# Patient Record
Sex: MALE | Employment: OTHER | URBAN - METROPOLITAN AREA
[De-identification: names, ages, dates, MRNs, and addresses within clinical notes are randomized per-mention and may not be internally consistent; named-entity substitution may affect disease eponyms.]

---

## 2017-01-18 ENCOUNTER — HOSPITAL ENCOUNTER (OUTPATIENT)
Dept: PREADMISSION TESTING | Age: 77
Discharge: HOME | End: 2017-01-18
Attending: PLASTIC SURGERY
Payer: MEDICARE

## 2017-01-18 DIAGNOSIS — Z01.812 BLOOD TESTS PRIOR TO TREATMENT OR PROCEDURE: ICD-10-CM

## 2017-01-18 DIAGNOSIS — C44.311 BASAL CELL CARCINOMA OF NASOLABIAL GROOVE: ICD-10-CM

## 2017-01-18 DIAGNOSIS — Z01.818 PREOP EXAMINATION: ICD-10-CM

## 2017-01-18 LAB
ATRIAL RATE: 375 BPM
CALCULATED R AXIS, ECG10: 64 DEGREES
CALCULATED T AXIS, ECG11: 6 DEGREES
DIAGNOSIS, 93000: NORMAL
FAX TO INFO,FAXT: NORMAL
FAX TO NUMBER,FAXN: NORMAL
HCT VFR BLD AUTO: 42 % (ref 36–48)
HGB BLD-MCNC: 15.2 G/DL (ref 13–16)
POTASSIUM SERPL-SCNC: 3.9 MMOL/L (ref 3.5–5.5)
Q-T INTERVAL, ECG07: 320 MS
QRS DURATION, ECG06: 94 MS
QTC CALCULATION (BEZET), ECG08: 465 MS
VENTRICULAR RATE, ECG03: 127 BPM

## 2017-01-18 PROCEDURE — 93005 ELECTROCARDIOGRAM TRACING: CPT

## 2017-01-18 PROCEDURE — 36415 COLL VENOUS BLD VENIPUNCTURE: CPT | Performed by: PLASTIC SURGERY

## 2017-01-18 PROCEDURE — 85018 HEMOGLOBIN: CPT | Performed by: PLASTIC SURGERY

## 2017-01-18 PROCEDURE — 84132 ASSAY OF SERUM POTASSIUM: CPT | Performed by: PLASTIC SURGERY

## 2017-05-14 ENCOUNTER — HOSPITAL ENCOUNTER (OUTPATIENT)
Age: 77
Setting detail: OBSERVATION
Discharge: HOME | End: 2017-05-17
Attending: EMERGENCY MEDICINE | Admitting: INTERNAL MEDICINE
Payer: MEDICARE

## 2017-05-14 ENCOUNTER — APPOINTMENT (OUTPATIENT)
Dept: CT IMAGING | Age: 77
End: 2017-05-14
Attending: EMERGENCY MEDICINE
Payer: MEDICARE

## 2017-05-14 DIAGNOSIS — I48.20 CHRONIC ATRIAL FIBRILLATION (HCC): ICD-10-CM

## 2017-05-14 DIAGNOSIS — R47.01 APHASIA: Primary | ICD-10-CM

## 2017-05-14 PROBLEM — I10 ESSENTIAL HYPERTENSION: Status: ACTIVE | Noted: 2017-05-14

## 2017-05-14 PROBLEM — G45.9 TIA (TRANSIENT ISCHEMIC ATTACK): Status: ACTIVE | Noted: 2017-05-14

## 2017-05-14 PROBLEM — E03.9 ACQUIRED HYPOTHYROIDISM: Status: ACTIVE | Noted: 2017-05-14

## 2017-05-14 LAB
ANION GAP BLD CALC-SCNC: 6 MMOL/L (ref 3–18)
APTT PPP: 42.6 SEC (ref 23–36.4)
ATRIAL RATE: 45 BPM
BASOPHILS # BLD AUTO: 0.1 K/UL (ref 0–0.06)
BASOPHILS # BLD: 1 % (ref 0–2)
BUN SERPL-MCNC: 18 MG/DL (ref 7–18)
BUN/CREAT SERPL: 17 (ref 12–20)
CALCIUM SERPL-MCNC: 9.5 MG/DL (ref 8.5–10.1)
CALCULATED R AXIS, ECG10: 37 DEGREES
CALCULATED T AXIS, ECG11: 39 DEGREES
CHLORIDE SERPL-SCNC: 102 MMOL/L (ref 100–108)
CK MB CFR SERPL CALC: 1.9 % (ref 0–4)
CK MB SERPL-MCNC: 2.2 NG/ML (ref 5–25)
CK SERPL-CCNC: 114 U/L (ref 39–308)
CO2 SERPL-SCNC: 28 MMOL/L (ref 21–32)
CREAT SERPL-MCNC: 1.05 MG/DL (ref 0.6–1.3)
DIAGNOSIS, 93000: NORMAL
DIFFERENTIAL METHOD BLD: ABNORMAL
EOSINOPHIL # BLD: 0.3 K/UL (ref 0–0.4)
EOSINOPHIL NFR BLD: 4 % (ref 0–5)
ERYTHROCYTE [DISTWIDTH] IN BLOOD BY AUTOMATED COUNT: 13.3 % (ref 11.6–14.5)
GLUCOSE BLD STRIP.AUTO-MCNC: 124 MG/DL (ref 70–110)
GLUCOSE SERPL-MCNC: 122 MG/DL (ref 74–99)
HCT VFR BLD AUTO: 43.1 % (ref 36–48)
HGB BLD-MCNC: 15.6 G/DL (ref 13–16)
INR PPP: 1.8 (ref 0.8–1.2)
LYMPHOCYTES # BLD AUTO: 31 % (ref 21–52)
LYMPHOCYTES # BLD: 1.8 K/UL (ref 0.9–3.6)
MCH RBC QN AUTO: 33.1 PG (ref 24–34)
MCHC RBC AUTO-ENTMCNC: 36.2 G/DL (ref 31–37)
MCV RBC AUTO: 91.5 FL (ref 74–97)
MONOCYTES # BLD: 0.4 K/UL (ref 0.05–1.2)
MONOCYTES NFR BLD AUTO: 7 % (ref 3–10)
NEUTS SEG # BLD: 3.4 K/UL (ref 1.8–8)
NEUTS SEG NFR BLD AUTO: 57 % (ref 40–73)
PLATELET # BLD AUTO: 117 K/UL (ref 135–420)
PMV BLD AUTO: 10.3 FL (ref 9.2–11.8)
POTASSIUM SERPL-SCNC: 4.2 MMOL/L (ref 3.5–5.5)
PROTHROMBIN TIME: 20.4 SEC (ref 11.5–15.2)
Q-T INTERVAL, ECG07: 448 MS
QRS DURATION, ECG06: 92 MS
QTC CALCULATION (BEZET), ECG08: 416 MS
RBC # BLD AUTO: 4.71 M/UL (ref 4.7–5.5)
SODIUM SERPL-SCNC: 136 MMOL/L (ref 136–145)
TROPONIN I SERPL-MCNC: <0.02 NG/ML (ref 0–0.04)
TSH SERPL DL<=0.05 MIU/L-ACNC: 2.98 UIU/ML (ref 0.36–3.74)
VENTRICULAR RATE, ECG03: 52 BPM
WBC # BLD AUTO: 6 K/UL (ref 4.6–13.2)

## 2017-05-14 PROCEDURE — 99218 HC RM OBSERVATION: CPT

## 2017-05-14 PROCEDURE — 74011250637 HC RX REV CODE- 250/637: Performed by: INTERNAL MEDICINE

## 2017-05-14 PROCEDURE — 82550 ASSAY OF CK (CPK): CPT | Performed by: INTERNAL MEDICINE

## 2017-05-14 PROCEDURE — 65390000012 HC CONDITION CODE 44 OBSERVATION

## 2017-05-14 PROCEDURE — 99285 EMERGENCY DEPT VISIT HI MDM: CPT

## 2017-05-14 PROCEDURE — 82962 GLUCOSE BLOOD TEST: CPT

## 2017-05-14 PROCEDURE — 84443 ASSAY THYROID STIM HORMONE: CPT | Performed by: INTERNAL MEDICINE

## 2017-05-14 PROCEDURE — 85610 PROTHROMBIN TIME: CPT | Performed by: EMERGENCY MEDICINE

## 2017-05-14 PROCEDURE — 85730 THROMBOPLASTIN TIME PARTIAL: CPT | Performed by: EMERGENCY MEDICINE

## 2017-05-14 PROCEDURE — 80048 BASIC METABOLIC PNL TOTAL CA: CPT | Performed by: EMERGENCY MEDICINE

## 2017-05-14 PROCEDURE — 85025 COMPLETE CBC W/AUTO DIFF WBC: CPT | Performed by: EMERGENCY MEDICINE

## 2017-05-14 PROCEDURE — 93005 ELECTROCARDIOGRAM TRACING: CPT

## 2017-05-14 PROCEDURE — 70450 CT HEAD/BRAIN W/O DYE: CPT

## 2017-05-14 RX ORDER — TAMSULOSIN HYDROCHLORIDE 0.4 MG/1
0.4 CAPSULE ORAL DAILY
Status: DISCONTINUED | OUTPATIENT
Start: 2017-05-15 | End: 2017-05-17 | Stop reason: HOSPADM

## 2017-05-14 RX ORDER — METOPROLOL TARTRATE 25 MG/1
25 TABLET, FILM COATED ORAL 2 TIMES DAILY
Status: DISCONTINUED | OUTPATIENT
Start: 2017-05-14 | End: 2017-05-16

## 2017-05-14 RX ORDER — PANTOPRAZOLE SODIUM 40 MG/1
40 TABLET, DELAYED RELEASE ORAL
Status: DISCONTINUED | OUTPATIENT
Start: 2017-05-15 | End: 2017-05-17 | Stop reason: HOSPADM

## 2017-05-14 RX ORDER — AMLODIPINE BESYLATE 5 MG/1
5 TABLET ORAL DAILY
Status: DISCONTINUED | OUTPATIENT
Start: 2017-05-15 | End: 2017-05-16

## 2017-05-14 RX ORDER — LEVOTHYROXINE SODIUM 25 UG/1
25 TABLET ORAL
Status: DISCONTINUED | OUTPATIENT
Start: 2017-05-15 | End: 2017-05-17 | Stop reason: HOSPADM

## 2017-05-14 RX ORDER — SODIUM CHLORIDE 0.9 % (FLUSH) 0.9 %
5-10 SYRINGE (ML) INJECTION AS NEEDED
Status: DISCONTINUED | OUTPATIENT
Start: 2017-05-14 | End: 2017-05-17 | Stop reason: HOSPADM

## 2017-05-14 RX ORDER — RAMIPRIL 5 MG/1
10 CAPSULE ORAL DAILY
Status: DISCONTINUED | OUTPATIENT
Start: 2017-05-14 | End: 2017-05-17 | Stop reason: HOSPADM

## 2017-05-14 RX ORDER — SODIUM CHLORIDE 0.9 % (FLUSH) 0.9 %
5-10 SYRINGE (ML) INJECTION EVERY 8 HOURS
Status: DISCONTINUED | OUTPATIENT
Start: 2017-05-14 | End: 2017-05-15

## 2017-05-14 RX ORDER — WARFARIN SODIUM 5 MG/1
5 TABLET ORAL
Status: COMPLETED | OUTPATIENT
Start: 2017-05-14 | End: 2017-05-14

## 2017-05-14 RX ADMIN — RAMIPRIL 10 MG: 5 CAPSULE ORAL at 18:22

## 2017-05-14 RX ADMIN — WARFARIN SODIUM 5 MG: 5 TABLET ORAL at 22:04

## 2017-05-14 RX ADMIN — Medication 10 ML: at 18:25

## 2017-05-14 RX ADMIN — METOPROLOL TARTRATE 25 MG: 25 TABLET ORAL at 18:22

## 2017-05-14 NOTE — IP AVS SNAPSHOT
[address] [address] [address] Patient: Tatiana Graham MRN: [id number] Donata Buckle You are allergic to the following No active allergies Recent Documentation Height Weight BMI Smoking Status 1.778 m 91.6 kg 28.98 kg/m2 Never Smoker Emergency Contacts Name Discharge Info Relation Home Work Mobile Erasto Novoa DISCHARGE CAREGIVER [3] Son [22] [phone] Virginia Carbtree  Girlfriend [18]   [phone] About your hospitalization You were admitted on:  May 14, 2017 You last received care in the:  [facility] [address] You were discharged on:  May 17, 2017 Unit phone number:  [phone] Why you were hospitalized Your primary diagnosis was:  Not on File Your diagnoses also included:  Aphasia, Tia (Transient Ischemic Attack), Essential Hypertension, Chronic Atrial Fibrillation (Hcc), Acquired Hypothyroidism Providers Seen During Your Hospitalizations Provider Role Specialty Primary office phone Starla Saeed MD Attending Provider Emergency Medicine [phone] Erick Seo MD Attending Provider Internal Medicine [phone] Your Primary Care Physician (PCP) Primary Care Physician Office Phone Office Fax Kimber Restrepo [phone] Follow-up Information Follow up With Details Comments Contact Info Vladimir Dunne MD On 5/18/2017 @ 2:30 pm 9 [address] 
[phone] Gauri Schmitt MD On 7/20/2017 @ 9:00 am (  in [address] with Fleurette Sicks ) [address] [address] 
[phone] Hasmukh Paulino MD On 6/20/2017 @ 9:30 am [address] [facility] [place] [address] [phone] Your Appointments Tuesday June 20, 2017  3:40 PM EDT Follow Up with Hasmukh Paulino MD  
 [address] [facility] ([facility] [address] [facility] [address] [phone] Current Discharge Medication List  
  
START taking these medications Dose & Instructions Dispensing Information Comments Morning Noon Evening Bedtime  
 atorvastatin 80 mg tablet Commonly known as:  LIPITOR Your last dose was: Your next dose is:    
   
   
 Dose:  80 mg Take 1 Tab by mouth nightly. Quantity:  30 Tab Refills:  0  
     
   
   
   
  
 metoprolol tartrate 25 mg tablet Commonly known as:  LOPRESSOR Your last dose was: Your next dose is:    
   
   
 Dose:  12.5 mg Take 0.5 Tabs by mouth every twelve (12) hours. Quantity:  60 Tab Refills:  0 CONTINUE these medications which have CHANGED Dose & Instructions Dispensing Information Comments Morning Noon Evening Bedtime  
 warfarin 6 mg tablet Commonly known as:  COUMADIN What changed:   
- medication strength 
- how much to take Your last dose was: Your next dose is:    
   
   
 Dose:  6 mg Take 1 Tab by mouth daily. Quantity:  15 Tab Refills:  0 CONTINUE these medications which have NOT CHANGED Dose & Instructions Dispensing Information Comments Morning Noon Evening Bedtime  
 levothyroxine 25 mcg tablet Commonly known as:  SYNTHROID Your last dose was: Your next dose is:    
   
   
 Dose:  25 mcg Take 25 mcg by mouth Daily (before breakfast). Refills:  0  
     
   
   
   
  
 probenecid-colchicine 500-0.5 mg per tablet Commonly known as:  ColBenemid Your last dose was: Your next dose is:    
   
   
 Dose:  1 Tab Take 1 Tab by mouth daily. Refills:  0  
     
   
   
   
  
 ramipril 10 mg capsule Commonly known as:  ALTACE Your last dose was:     
   
Your next dose is:    
   
   
 Dose:  10 mg  
 Take 10 mg by mouth daily. Refills:  0  
     
   
   
   
  
 tamsulosin 0.4 mg capsule Commonly known as:  FLOMAX Your last dose was: Your next dose is:    
   
   
 Dose:  0.4 mg Take 0.4 mg by mouth daily. Refills:  0 STOP taking these medications   
 amLODIPine 5 mg tablet Commonly known as:  NORVASC  
   
  
 metoprolol succinate 50 mg XL tablet Commonly known as:  TOPROL-XL  
   
  
 pravastatin 20 mg tablet Commonly known as:  PRAVACHOL Where to Get Your Medications Information on where to get these meds will be given to you by the nurse or doctor. ! Ask your nurse or doctor about these medications  
  atorvastatin 80 mg tablet  
 metoprolol tartrate 25 mg tablet  
 warfarin 6 mg tablet Discharge Instructions Discharge Instructions Patient: Froylan Rangel MRN: [id number]  [facility]: [id number] YOB: 1940  Age: 77 y.o. Sex: male DOA: 5/14/2017 LOS:  LOS: 0 days   Discharge Date: DIET:  Cardiac Diet ACTIVITY: Activity as tolerated ADDITIONAL INFORMATION: If you experience any of the following symptoms but not limited to Fever, chills, nausea, vomiting, diarrhea, change in mentation, falling, bleeding, shortness of breath, chest pain, please call your primary care physician or return to the emergency room if you cannot get hold of your doctor:  
 
FOLLOW UP CARE: 
Dr. Florencia Osorio MD in 7-10 days. Please call and set up an appointment. Dr. Mickey Krishna, cardio in 2 week Dr. Diaz Pickering, neuro in 4 week Jack Browne MD 
5/17/2017 2:25 PM 
 
M[employer] Activation Thank you for requesting access to [employer]. Please follow the instructions below to securely access and download your online medical record. [employer] allows you to send messages to your doctor, view your test results, renew your prescriptions, schedule appointments, and more. How Do I Sign Up? 1. In your internet browser, go to [email or web address] 
2. Click on the First Time User? Click Here link in the Sign In box. You will be redirect to the New Member Sign Up page. 3. Enter your [employer] Access Code exactly as it appears below. You will not need to use this code after youve completed the sign-up process. If you do not sign up before the expiration date, you must request a new code. [employer] Access Code: [email or web address] Expires: 2017 12:45 PM (This is the date your [employer] access code will ) 4. Enter the last four digits of your Social Security Number (xxxx) and Date of Birth (mm/dd/yyyy) as indicated and click Submit. You will be taken to the next sign-up page. 5. Create a [employer] ID. This will be your [employer] login ID and cannot be changed, so think of one that is secure and easy to remember. 6. Create a [employer] password. You can change your password at any time. 7. Enter your Password Reset Question and Answer. This can be used at a later time if you forget your password. 8. Enter your e-mail address. You will receive e-mail notification when new information is available in [address] 9. Click Sign Up. You can now view and download portions of your medical record. 10. Click the Download Summary menu link to download a portable copy of your medical information. Additional Information If you have questions, please visit the Frequently Asked Questions section of the [employer] website at [email or web address] Remember, [employer] is NOT to be used for urgent needs. For medical emergencies, dial 911. Patient armband removed and shredded DISCHARGE SUMMARY from Nurse The following personal items are in your possession at time of discharge: 
 
Dental Appliances: None Visual Aid: Glasses, With patient Home Medications: None Jewelry: With patient Clothing: Shorts, Shirt, Footwear, Undergarments Other Valuables: Cell Phone PATIENT INSTRUCTIONS: 
 
What to do at Home: 
Recommended activity: Activity as tolerated, If you experience any of the following symptoms chest pain, shortness of breath, fever, chills, elevated temperature greater than 100.9 F, please follow up with nearest Emergency room. *  Please give a list of your current medications to your Primary Care Provider. *  Please update this list whenever your medications are discontinued, doses are 
    changed, or new medications (including over-the-counter products) are added. *  Please carry medication information at all times in case of emergency situations. These are general instructions for a healthy lifestyle: No smoking/ No tobacco products/ Avoid exposure to second hand smoke Surgeon General's Warning:  Quitting smoking now greatly reduces serious risk to your health. Obesity, smoking, and sedentary lifestyle greatly increases your risk for illness A healthy diet, regular physical exercise & weight monitoring are important for maintaining a healthy lifestyle You may be retaining fluid if you have a history of heart failure or if you experience any of the following symptoms:  Weight gain of 3 pounds or more overnight or 5 pounds in a week, increased swelling in our hands or feet or shortness of breath while lying flat in bed. Please call your doctor as soon as you notice any of these symptoms; do not wait until your next office visit. Recognize signs and symptoms of STROKE: 
 
F-face looks uneven A-arms unable to move or move unevenly S-speech slurred or non-existent T-time-call 911 as soon as signs and symptoms begin-DO NOT go Back to bed or wait to see if you get better-TIME IS BRAIN. Warning Signs of HEART ATTACK Call 911 if you have these symptoms: 
? Chest discomfort.  Most heart attacks involve discomfort in the center of the chest that lasts more than a few minutes, or that goes away and comes back. It can feel like uncomfortable pressure, squeezing, fullness, or pain. ? Discomfort in other areas of the upper body. Symptoms can include pain or discomfort in one or both arms, the back, neck, jaw, or stomach. ? Shortness of breath with or without chest discomfort. ? Other signs may include breaking out in a cold sweat, nausea, or lightheadedness. Don't wait more than five minutes to call [address]! Fast action can save your life. Calling 911 is almost always the fastest way to get lifesaving treatment. Emergency Medical Services staff can begin treatment when they arrive  up to an hour sooner than if someone gets to the hospital by car. The discharge information has been reviewed with the patient. The patient verbalized understanding. Discharge medications reviewed with the patient and appropriate educational materials and side effects teaching were provided. Discharge Instructions Attachments/References ATORVASTATIN (BY MOUTH) (ENGLISH) METOPROLOL (BY MOUTH) (ENGLISH) Discharge Orders None [employer] Announcement We are excited to announce that we are making your provider's discharge notes available to you in Exhibition [employer]. You will see these notes when they are completed and signed by the physician that discharged you from your recent hospital stay. If you have any questions or concerns about any information you see in Exhibition A, please call the Health Information Department where you were seen or reach out to your Primary Care Provider for more information about your plan of care. Introducing hospitals & HEALTH SERVICES! [address] introduces Exhibition A patient portal. Now you can access parts of your medical record, email your doctor's office, and request medication refills online. 1. In your internet browser, go to [email or web address] 2. Click on the First Time User? Click Here link in the Sign In box. You will see the New Member Sign Up page. 3. Enter your [employer] Access Code exactly as it appears below. You will not need to use this code after youve completed the sign-up process. If you do not sign up before the expiration date, you must request a new code. · [employer] Access Code: [email or web address] Expires: 8/13/2017 12:45 PM 
 
4. Enter the last four digits of your Social Security Number (xxxx) and Date of Birth (mm/dd/yyyy) as indicated and click Submit. You will be taken to the next sign-up page. 5. Create a [employer] ID. This will be your [employer] login ID and cannot be changed, so think of one that is secure and easy to remember. 6. Create a [employer] password. You can change your password at any time. 7. Enter your Password Reset Question and Answer. This can be used at a later time if you forget your password. 8. Enter your e-mail address. You will receive e-mail notification when new information is available in [address] 9. Click Sign Up. You can now view and download portions of your medical record. 10. Click the Download Summary menu link to download a portable copy of your medical information. If you have questions, please visit the Frequently Asked Questions section of the [employer] website. Remember, [employer] is NOT to be used for urgent needs. For medical emergencies, dial 911. Now available from your iPhone and Android! General Information Please provide this summary of care documentation to your next provider. Patient Signature:  ____________________________________________________________ Date:  ____________________________________________________________  
  
Michelle Lightning Provider Signature:  ____________________________________________________________ Date:  ____________________________________________________________ More Information Atorvastatin (By mouth) Atorvastatin (a-tor-va-STAT-in) Treats high cholesterol and triglyceride levels. Reduces the risk of angina, stroke, heart attack, or certain heart and blood vessel problems. This medicine is a statin. Brand Name(s): Lipitor There may be other brand names for this medicine. When This Medicine Should Not Be Used: This medicine is not right for everyone. Do not use it if you had an allergic reaction to atorvastatin, if you have active liver disease, or if you are pregnant or breastfeeding. How to Use This Medicine:  
Tablet · Take your medicine as directed. Your dose may need to be changed several times to find what works best for you. · Take this medicine at the same time each day. · Swallow the tablet whole. Do not break it. · Missed dose: Take a dose as soon as you remember. If it is less than 12 hours until your next dose, skip the missed dose and take the next dose at the regular time. Do not take 2 doses of this medicine within 12 hours. · Read and follow the patient instructions that come with this medicine. Talk to your doctor or pharmacist if you have any questions. · Store the medicine in a closed container at room temperature, away from heat, moisture, and direct light. Drugs and Foods to Avoid: Ask your doctor or pharmacist before using any other medicine, including over-the-counter medicines, vitamins, and herbal products. · Some medicines can affect how atorvastatin works. Tell your doctor if you also use birth control pills, boceprevir, cimetidine, colchicine, cyclosporine, digoxin, niacin, rifampin, spironolactone, telaprevir, medicine to treat an infection, or medicine to treat HIV/AIDS. Warnings While Using This Medicine: · It is not safe to take this medicine during pregnancy. It could harm an unborn baby. Tell your doctor right away if you become pregnant.  
· Tell your doctor if you have kidney disease, diabetes, muscle pain or weakness, thyroid problems, have recently had a stroke or TIA (transient ischemic attack), or have a history of liver disease. Tell your doctor if you drink grapefruit juice or drink alcohol regularly. · This medicine can cause muscle problems, which can lead to kidney problems. · Tell any doctor or dentist who treats you that you use this medicine. You may need to stop using it if you have surgery, have an injury, or develop serious health problems. · Your doctor will do lab tests at regular visits to check on the effects of this medicine. Keep all appointments. · Keep all medicine out of the reach of children. Never share your medicine with anyone. Possible Side Effects While Using This Medicine:  
Call your doctor right away if you notice any of these side effects: · Allergic reaction: Itching or hives, swelling in your face or hands, swelling or tingling in your mouth or throat, chest tightness, trouble breathing · Blistering, peeling, red skin rash · Change in how much or how often you urinate · Dark urine or pale stools, nausea, vomiting, loss of appetite, stomach pain, yellow skin or eyes · Fever · Muscle pain, tenderness, or weakness · Unusual tiredness If you notice these less serious side effects, talk with your doctor: · Diarrhea · Joint pain If you notice other side effects that you think are caused by this medicine, tell your doctor. Call your doctor for medical advice about side effects. You may report side effects to FDA at 2-395-FDA-1944 © 2017 [address] Information is for End User's use only and may not be sold, redistributed or otherwise used for commercial purposes. The above information is an  only. It is not intended as medical advice for individual conditions or treatments. Talk to your doctor, nurse or pharmacist before following any medical regimen to see if it is safe and effective for you. Metoprolol (By mouth) Metoprolol (met-oh-PROE-lol) Treats high blood pressure, angina (chest pain), and heart failure. May lower the risk of death after a heart attack. This medicine is a beta-blocker. Brand Name(s): Lopressor, Toprol XL There may be other brand names for this medicine. When This Medicine Should Not Be Used: This medicine is not right for everyone. Do not use if you had an allergic reaction to metoprolol or similar medicines. Do not use this medicine if you have certain blood circulation or heart problems. Ask your doctor about these problems. How to Use This Medicine:  
Tablet, Long Acting Tablet · Take your medicine as directed. Your dose may need to be changed several times to find what works best for you. · Take this medicine with a meal or right after a meal. Take this medicine the same way every day, at the same time. · Swallow the tablet whole with a glass of water. You may break the extended-release tablet in half, but do not chew or crush it. · Missed dose: Take a dose as soon as you remember. If it is almost time for your next dose, wait until then and take a regular dose. Do not take extra medicine to make up for a missed dose. · Store the medicine in a closed container at room temperature, away from heat, moisture, and direct light. Drugs and Foods to Avoid: Ask your doctor or pharmacist before using any other medicine, including over-the-counter medicines, vitamins, and herbal products. · Some medicines can affect how metoprolol works. Tell your doctor if you are taking any of the following: ¨ Digoxin, dipyridamole, hydralazine, hydroxychloroquine, methyldopa, quinidine ¨ Medicine to treat depression (such as bupropion, clomipramine, desipramine, fluoxetine, fluvoxamine, paroxetine, sertraline), medicine to treat mental illness (such as chlorpromazine, fluphenazine, haloperidol, thioridazine), medicine for heart rhythm problems (such as propafenone), HIV/AIDS medicine (such as ritonavir), medicine to treat a fungus infection (such as terbinafine), a monoamine oxidase inhibitor (MAOI), an ergot medicine for headaches, a calcium channel blocker (such as amlodipine, diltiazem, verapamil), or an alpha blocker (such as clonidine, prazosin, reserpine, guanethidine) Warnings While Using This Medicine: · Tell your doctor if you are pregnant or breastfeeding, or if you have blood vessel, heart, or circulation problems (such as heart failure, rhythm problems, or slow heartbeat). Tell your doctor if you have kidney disease, liver disease, diabetes, lung disease (such as asthma), an overactive thyroid, or a history of allergies. · This medicine may cause worse symptoms of heart failure while the dose is being adjusted. · Do not stop using this medicine suddenly. Your doctor will need to slowly decrease your dose before you stop it completely. · Tell any doctor or dentist who treats you that you are using this medicine. You may need to stop using this medicine several days before you have surgery or medical tests. · This medicine could lower your blood pressure too much, especially when you first use it or if you are dehydrated. Stand or sit up slowly if you feel lightheaded or dizzy. · This medicine may make you dizzy or drowsy. Do not drive or do anything else that could be dangerous until you know how this medicine affects you. · Your doctor will check your progress and the effects of this medicine at regular visits. Keep all appointments. · Keep all medicine out of the reach of children. Never share your medicine with anyone. Possible Side Effects While Using This Medicine:  
Call your doctor right away if you notice any of these side effects: · Allergic reaction: Itching or hives, swelling in your face or hands, swelling or tingling in your mouth or throat, chest tightness, trouble breathing · Lightheadedness, dizziness, or fainting · Slow heartbeat · Swelling in your hands, ankles, or feet, trouble breathing, tiredness · Worsening chest pain If you notice these less serious side effects, talk with your doctor: · Diarrhea · Mild dizziness or tiredness If you notice other side effects that you think are caused by this medicine, tell your doctor. Call your doctor for medical advice about side effects. You may report side effects to FDA at 0-780-FDA-7967 © 2017 [address] Information is for End User's use only and may not be sold, redistributed or otherwise used for commercial purposes. The above information is an  only. It is not intended as medical advice for individual conditions or treatments. Talk to your doctor, nurse or pharmacist before following any medical regimen to see if it is safe and effective for you.

## 2017-05-14 NOTE — H&P
History and Physical    Patient: Lakshmi Orona MRN: [id number]  SSN: [id number]    YOB: 1940  Age: 77 y.o. Sex: male      Subjective:      Lakshmi Orona is a 77 y.o. male with PMH of afib/ HTN/ Hypothyroid /  came with temporary speech disturbance and associated wit confusion and momentary memory loss . Patient is historian     Patient is from [facility] visiting, yesterday they attended graduation at [facility] and it was timing  last night he slept good , this morning came to visit friend , while talking he suddenly had speech disturbance and confusion , lasted for 30 mints , by the time he came to ED he was fine. CODE S was called in , he is now reg admitted for JONNIE - Tele and in house neurologist consulted . CT head - no acute lesion     According to patient his HR are very slow at times and hence he is not taking Lopressor 50 mg daily ( as prescribed ) but takes 50 and 25 - alternating . His PCP and Cardiologist are in [facility]. Patient denies Fever , Chills , Weight loss or Weight Gain , No SOB, No Cough , no Hemoptysis , No Chest pains , No Palpitations , No NVD , no Focal deficit . Full CODE   DVT prophylaxis - on Warfarin       Past Medical History:   Diagnosis Date    A-fib [facility])      History reviewed. No pertinent surgical history. History reviewed. No pertinent family history. Social History   Substance Use Topics    Smoking status: Never Smoker    Smokeless tobacco: Not on file    Alcohol use No      Prior to Admission medications    Not on File        No Known Allergies    Review of Systems:  A comprehensive review of systems was negative except for that written in the History of Present Illness. Objective: Body mass index is 28.7 kg/(m^2).   Vitals:    05/14/17 1430 05/14/17 1445 05/14/17 1500 05/14/17 1530   BP: 158/81 177/88 (!) 185/124 167/89   Pulse: (!) 51 (!) 54 (!) 57 74   Resp: 15 14 15 12   Temp:       SpO2: 99% 96% 98% 97%   Weight: Height:            Physical Exam:  General appearance - alert, well appearing, and in no distress, oriented to person, place, and time and normal appearing weight  Mental status - alert, oriented to person, place, and time, normal mood, behavior, speech, dress, motor activity, and thought processes  Eyes - pupils equal and reactive, extraocular eye movements intact  Ears - bilateral TM's and external ear canals normal  Nose - normal and patent, no erythema, discharge or polyps  Mouth - mucous membranes moist, pharynx normal without lesions  Neck - supple, no significant adenopathy  Chest - clear to auscultation, no wheezes, rales or rhonchi, symmetric air entry  Heart - irregularly irregular rhythm with rate 60-80  Abdomen - soft, nontender, nondistended, no masses or organomegaly  Back exam - full range of motion, no tenderness, palpable spasm or pain on motion  Neurological - alert, oriented, normal speech, no focal findings or movement disorder noted  Musculoskeletal - no joint tenderness, deformity or swelling  Extremities - peripheral pulses normal, no pedal edema, no clubbing or cyanosis  Skin - normal coloration and turgor, no rashes, no suspicious skin lesions noted     Assessment:     Hospital Problems  Date Reviewed: 5/14/2017          Codes Class Noted POA    Aphasia ICD-10-CM: R47.01  ICD-9-CM: 784.3  5/14/2017 Unknown        TIA (transient ischemic attack) ICD-10-CM: G45.9  ICD-9-CM: 435.9  5/14/2017 Unknown        Essential hypertension ICD-10-CM: I10  ICD-9-CM: 401.9  5/14/2017 Unknown        Chronic atrial fibrillation (HCC) ICD-10-CM: I48.2  ICD-9-CM: 427.31  5/14/2017 Unknown        Acquired hypothyroidism ICD-10-CM: E03.9  ICD-9-CM: 244.9  5/14/2017 Unknown              CBC:  Lab Results   Component Value Date/Time    WBC 6.0 05/14/2017 01:50 PM    HGB 15.6 05/14/2017 01:50 PM    HCT 43.1 05/14/2017 01:50 PM    PLATELET 138 04/21/7045 01:50 PM    MCV 91.5 05/14/2017 01:50 PM        CMP:  Lab Results   Component Value Date/Time    Sodium 136 05/14/2017 01:50 PM    Potassium 4.2 05/14/2017 01:50 PM    Chloride 102 05/14/2017 01:50 PM    CO2 28 05/14/2017 01:50 PM    Anion gap 6 05/14/2017 01:50 PM    Glucose 122 05/14/2017 01:50 PM    BUN 18 05/14/2017 01:50 PM    Creatinine 1.05 05/14/2017 01:50 PM    BUN/Creatinine ratio 17 05/14/2017 01:50 PM    GFR est AA >60 05/14/2017 01:50 PM    GFR est non-AA >60 05/14/2017 01:50 PM    Calcium 9.5 05/14/2017 01:50 PM        PT/INR  Lab Results   Component Value Date/Time    INR 1.8 05/14/2017 01:50 PM    Prothrombin time 20.4 05/14/2017 01:50 PM            EKG: No results found for this or any previous visit. CT head without IV contrast        All CT scans at this facility are performed using dose optimization technique as  appropriate to a performed exam, to include automated exposure control,  adjustment of the mA and/or kV according to patient size (including appropriate  matching for site specific examination) or use of iterative reconstruction  technique.     INDICATION: Stroke alert. CODE S. Onset of confusion. Unable to remember family  names.     Cortical sulci and ventricles are within normal limits for age. No midline shift  or extra-axial collection. No intracranial hemorrhage, mass lesions or cortical  infarct involving a major vessel. Visualized paranasal sinuses and mastoid air  cells are clear. Lucency in the occipital calvarium with thickening of the inner  and outer table cortex noted.     IMPRESSION  IMPRESSION: No acute intracranial abnormalities by CT. No hemorrhage. Nonspecific lucency in the occipital calvarium. Differential diagnosis would  include metastatic disease and multiple myeloma.  Clinical correlation?     Code S report directly discussed with Dr. Amara Dennis at 1410 hrs                    Plan:   TIA   - severe neurological symptoms and absence of any cardiac symptoms   - Get MRI - r/o CVA  - Monitor on Tele   - Trend Trop Afib  Continue warfarin   Follow TSH   Continue Lopressor at 25mg daily     HTN  - Continue home meds , ACE       Abnormal CT Head   - Nonspecific lucency in the occipital calvarium. Differential diagnosis would  include metastatic disease and multiple myeloma. - Follow MRI With contrast     D/W patient and multiple family members in room with him       Signed By: Lorenzo Robb MD     May 14, 2017

## 2017-05-14 NOTE — PROGRESS NOTES
conducted an initial consultation and Spiritual Assessment for Imani Tucker, who is a 77 y. o.,male. Patients Primary Language is: [place]. According to the patients EMR Mormonism Affiliation is: [place]. The reason the Patient came to the hospital is: There are no active problems to display for this patient. The  provided the following Interventions:  Initiated a relationship of care and support. Listened empathically. Provided chaplaincy education. Provided information about Spiritual Care Services. Offered prayer and assurance of continued prayers on patient's behalf. Chart reviewed. The following outcomes where achieved:  Patient processed feeling about current hospitalization. Patient expressed gratitude for 's visit. Assessment:  Patient does not have any Lutheran/cultural needs that will affect patients preferences in health care. There are no spiritual or Lutheran issues which require intervention at this time. Plan:  Chaplains will continue to follow and will provide pastoral care on an as needed/requested basis.  recommends bedside caregivers page  on duty if patient shows signs of acute spiritual or emotional distress.         [address] [facility].   [phone]

## 2017-05-14 NOTE — ED NOTES
Patient to ED with friend with c/o difficulty remembering family members' names. Patient reports symptoms started at 1230 approx. Patient denies any other symptoms. Ambulatory on arrival. NKDA. Denies any pain.

## 2017-05-14 NOTE — IP AVS SNAPSHOT
Current Discharge Medication List  
  
START taking these medications Dose & Instructions Dispensing Information Comments Morning Noon Evening Bedtime  
 atorvastatin 80 mg tablet Commonly known as:  LIPITOR Your last dose was: Your next dose is:    
   
   
 Dose:  80 mg Take 1 Tab by mouth nightly. Quantity:  30 Tab Refills:  0  
     
   
   
   
  
 metoprolol tartrate 25 mg tablet Commonly known as:  LOPRESSOR Your last dose was: Your next dose is:    
   
   
 Dose:  12.5 mg Take 0.5 Tabs by mouth every twelve (12) hours. Quantity:  60 Tab Refills:  0 CONTINUE these medications which have CHANGED Dose & Instructions Dispensing Information Comments Morning Noon Evening Bedtime  
 warfarin 6 mg tablet Commonly known as:  COUMADIN What changed:   
- medication strength 
- how much to take Your last dose was: Your next dose is:    
   
   
 Dose:  6 mg Take 1 Tab by mouth daily. Quantity:  15 Tab Refills:  0 CONTINUE these medications which have NOT CHANGED Dose & Instructions Dispensing Information Comments Morning Noon Evening Bedtime  
 levothyroxine 25 mcg tablet Commonly known as:  SYNTHROID Your last dose was: Your next dose is:    
   
   
 Dose:  25 mcg Take 25 mcg by mouth Daily (before breakfast). Refills:  0  
     
   
   
   
  
 probenecid-colchicine 500-0.5 mg per tablet Commonly known as:  ColBenemid Your last dose was: Your next dose is:    
   
   
 Dose:  1 Tab Take 1 Tab by mouth daily. Refills:  0  
     
   
   
   
  
 ramipril 10 mg capsule Commonly known as:  ALTACE Your last dose was: Your next dose is:    
   
   
 Dose:  10 mg Take 10 mg by mouth daily. Refills:  0  
     
   
   
   
  
 tamsulosin 0.4 mg capsule Commonly known as:  FLOMAX Your last dose was: Your next dose is:    
   
   
 Dose:  0.4 mg Take 0.4 mg by mouth daily. Refills:  0 STOP taking these medications   
 amLODIPine 5 mg tablet Commonly known as:  NORVASC  
   
  
 metoprolol succinate 50 mg XL tablet Commonly known as:  TOPROL-XL  
   
  
 pravastatin 20 mg tablet Commonly known as:  PRAVACHOL Where to Get Your Medications Information on where to get these meds will be given to you by the nurse or doctor. ! Ask your nurse or doctor about these medications  
  atorvastatin 80 mg tablet  
 metoprolol tartrate 25 mg tablet  
 warfarin 6 mg tablet

## 2017-05-14 NOTE — ED PROVIDER NOTES
HPI Comments: Keshia Landry is a 77 y.o. male presenting with significant other for evaluation of altered mental status. SO states 1 hour PTA began having difficulty with his speech and was unable to speak. States he was otherwise alert, awake, denies facial asymmetry, weakness, trauma or recent illness. States his speech slowly returned and on arrival to the ED denies any complaints. Denies any h/o similar symptoms or h/o stroke. Does have h/o a fib and is on coumadin. The history is provided by the patient and a significant other. Past Medical History:   Diagnosis Date    A-fib [facility])        History reviewed. No pertinent surgical history. History reviewed. No pertinent family history. Social History     Social History    Marital status:      Spouse name: N/A    Number of children: N/A    Years of education: N/A     Occupational History    Not on file. Social History Main Topics    Smoking status: Never Smoker    Smokeless tobacco: Not on file    Alcohol use No    Drug use: Not on file    Sexual activity: Not on file     Other Topics Concern    Not on file     Social History Narrative    No narrative on file         ALLERGIES: Review of patient's allergies indicates no known allergies. Review of Systems   Constitutional: Negative for fever. HENT: Negative for congestion. Respiratory: Negative for cough and shortness of breath. Cardiovascular: Negative for chest pain and leg swelling. Gastrointestinal: Negative for abdominal pain, nausea and vomiting. Genitourinary: Negative for dysuria. Musculoskeletal: Negative. Neurological: Positive for speech difficulty. Negative for dizziness, syncope, facial asymmetry, weakness, light-headedness and headaches. All other systems reviewed and are negative.       Vitals:    05/14/17 1401 05/14/17 1403 05/14/17 1415   BP: 163/79  171/90   Pulse:  (!) 57 (!) 59   Resp:  13 15   Temp:  97.9 °F (36.6 °C)    SpO2:  98% 98%   Weight:  90.7 kg (200 lb)    Height:  5' 10\" (1.778 m)             Physical Exam   Constitutional: He is oriented to person, place, and time. No distress. HENT:   Head: Atraumatic. Mouth/Throat: Oropharynx is clear and moist.   Eyes: Conjunctivae are normal.   Neck: Normal range of motion. Neck supple. No JVD present. Cardiovascular:   No murmur heard. Irregularly irregular   Pulmonary/Chest: Effort normal and breath sounds normal. No respiratory distress. He exhibits no tenderness. Abdominal: Soft. Bowel sounds are normal. He exhibits no distension. There is no tenderness. There is no rebound and no guarding. Musculoskeletal: Normal range of motion. He exhibits no edema or tenderness. Neurological: He is alert and oriented to person, place, and time. He has normal strength. No cranial nerve deficit or sensory deficit. No clonus. No pronator drift. No obvious visual field deficits. Skin: Skin is warm and dry. Psychiatric: He has a normal mood and affect. Nursing note and vitals reviewed. MDM  Number of Diagnoses or Management Options  Diagnosis management comments: Helen Null is a 77 y.o. male presenting with episode of aphasia that has now resolved. No focal deficit at this time. Code s called on arrival to rule out TIA vs stroke mimic. No h/o seizure activity. ED Course       Procedures    Vitals:  Patient Vitals for the past 12 hrs:   Temp Pulse Resp BP SpO2   05/14/17 1415 - (!) 59 15 171/90 98 %   05/14/17 1403 97.9 °F (36.6 °C) (!) 57 13 - 98 %   05/14/17 1401 - - - 163/79 -           EKG interpretation by ED Physician:  1356 - a fib with slow ventricular rate, rate of 52, no STEMI      X-Ray, CT or other radiology findings or impressions:  CT HEAD WO CONT   Final Result              Progress notes, Consult notes or additional Procedure notes:   I have spoken with Dr Zoey Burgess, teleneurology about patient. He has evaluated patient and work up. Feels most c/w TIA. Recommends admission for stroke work up and monitoring for any return of symptoms. Pt and family agree with plan. Disposition:  Diagnosis:   1. Aphasia    2.  Chronic atrial fibrillation ([facility] [place] [address]        Disposition: Admitted    Lakeshia Oneil MD

## 2017-05-14 NOTE — ED NOTES
TRANSFER - OUT REPORT:    Verbal report given to Jareth Ross RN(name) on Rose Arteaga  being transferred to [address] [place] [address] Drive room 415(unit) for routine progression of care       Report consisted of patients Situation, Background, Assessment and   Recommendations(SBAR). Information from the following report(s) SBAR, ED Summary and MAR was reviewed with the receiving nurse. Lines:   Peripheral IV 05/14/17 Right Antecubital (Active)   Site Assessment Clean, dry, & intact 5/14/2017  2:11 PM   Phlebitis Assessment 0 5/14/2017  2:11 PM   Infiltration Assessment 0 5/14/2017  2:11 PM   Dressing Status Clean, dry, & intact 5/14/2017  2:11 PM   Dressing Type Transparent 5/14/2017  2:11 PM   Hub Color/Line Status Pink 5/14/2017  2:11 PM        Opportunity for questions and clarification was provided.       Patient transported with:   monitor, tech advanced

## 2017-05-15 LAB
ANION GAP BLD CALC-SCNC: 10 MMOL/L (ref 3–18)
BASOPHILS # BLD AUTO: 0.1 K/UL (ref 0–0.1)
BASOPHILS # BLD: 1 % (ref 0–2)
BUN SERPL-MCNC: 16 MG/DL (ref 7–18)
BUN/CREAT SERPL: 17 (ref 12–20)
CALCIUM SERPL-MCNC: 9.8 MG/DL (ref 8.5–10.1)
CHLORIDE SERPL-SCNC: 105 MMOL/L (ref 100–108)
CHOLEST SERPL-MCNC: 206 MG/DL
CK MB CFR SERPL CALC: 1.5 % (ref 0–4)
CK MB SERPL-MCNC: 1.6 NG/ML (ref 5–25)
CK SERPL-CCNC: 104 U/L (ref 39–308)
CO2 SERPL-SCNC: 25 MMOL/L (ref 21–32)
CREAT SERPL-MCNC: 0.93 MG/DL (ref 0.6–1.3)
DIFFERENTIAL METHOD BLD: ABNORMAL
EOSINOPHIL # BLD: 0.2 K/UL (ref 0–0.4)
EOSINOPHIL NFR BLD: 3 % (ref 0–5)
ERYTHROCYTE [DISTWIDTH] IN BLOOD BY AUTOMATED COUNT: 13.4 % (ref 11.6–14.5)
EST. AVERAGE GLUCOSE BLD GHB EST-MCNC: 117 MG/DL
GLUCOSE BLD STRIP.AUTO-MCNC: 144 MG/DL (ref 70–110)
GLUCOSE SERPL-MCNC: 97 MG/DL (ref 74–99)
HBA1C MFR BLD: 5.7 % (ref 4.2–5.6)
HCT VFR BLD AUTO: 40.8 % (ref 36–48)
HDLC SERPL-MCNC: 33 MG/DL (ref 40–60)
HDLC SERPL: 6.2 {RATIO} (ref 0–5)
HGB BLD-MCNC: 14.8 G/DL (ref 13–16)
INR PPP: 2 (ref 0.8–1.2)
LDLC SERPL CALC-MCNC: ABNORMAL MG/DL (ref 0–100)
LIPID PROFILE,FLP: ABNORMAL
LYMPHOCYTES # BLD AUTO: 30 % (ref 21–52)
LYMPHOCYTES # BLD: 1.8 K/UL (ref 0.9–3.6)
MCH RBC QN AUTO: 33 PG (ref 24–34)
MCHC RBC AUTO-ENTMCNC: 36.3 G/DL (ref 31–37)
MCV RBC AUTO: 90.9 FL (ref 74–97)
MONOCYTES # BLD: 0.4 K/UL (ref 0.05–1.2)
MONOCYTES NFR BLD AUTO: 7 % (ref 3–10)
NEUTS SEG # BLD: 3.6 K/UL (ref 1.8–8)
NEUTS SEG NFR BLD AUTO: 59 % (ref 40–73)
PLATELET # BLD AUTO: 124 K/UL (ref 135–420)
PMV BLD AUTO: 10.4 FL (ref 9.2–11.8)
POTASSIUM SERPL-SCNC: 4 MMOL/L (ref 3.5–5.5)
PROTHROMBIN TIME: 21.4 SEC (ref 11.5–15.2)
RBC # BLD AUTO: 4.49 M/UL (ref 4.7–5.5)
SODIUM SERPL-SCNC: 140 MMOL/L (ref 136–145)
TRIGL SERPL-MCNC: 433 MG/DL (ref ?–150)
TROPONIN I SERPL-MCNC: <0.02 NG/ML (ref 0–0.04)
VLDLC SERPL CALC-MCNC: ABNORMAL MG/DL
WBC # BLD AUTO: 6 K/UL (ref 4.6–13.2)

## 2017-05-15 PROCEDURE — 82962 GLUCOSE BLOOD TEST: CPT

## 2017-05-15 PROCEDURE — 74011250637 HC RX REV CODE- 250/637: Performed by: INTERNAL MEDICINE

## 2017-05-15 PROCEDURE — 85025 COMPLETE CBC W/AUTO DIFF WBC: CPT | Performed by: INTERNAL MEDICINE

## 2017-05-15 PROCEDURE — 65390000012 HC CONDITION CODE 44 OBSERVATION

## 2017-05-15 PROCEDURE — 74011250637 HC RX REV CODE- 250/637: Performed by: HOSPITALIST

## 2017-05-15 PROCEDURE — 36415 COLL VENOUS BLD VENIPUNCTURE: CPT | Performed by: INTERNAL MEDICINE

## 2017-05-15 PROCEDURE — 97161 PT EVAL LOW COMPLEX 20 MIN: CPT

## 2017-05-15 PROCEDURE — 83036 HEMOGLOBIN GLYCOSYLATED A1C: CPT | Performed by: INTERNAL MEDICINE

## 2017-05-15 PROCEDURE — 82550 ASSAY OF CK (CPK): CPT | Performed by: INTERNAL MEDICINE

## 2017-05-15 PROCEDURE — 92610 EVALUATE SWALLOWING FUNCTION: CPT

## 2017-05-15 PROCEDURE — 80061 LIPID PANEL: CPT | Performed by: INTERNAL MEDICINE

## 2017-05-15 PROCEDURE — 74011250637 HC RX REV CODE- 250/637: Performed by: PSYCHIATRY & NEUROLOGY

## 2017-05-15 PROCEDURE — 99218 HC RM OBSERVATION: CPT

## 2017-05-15 PROCEDURE — 80048 BASIC METABOLIC PNL TOTAL CA: CPT | Performed by: INTERNAL MEDICINE

## 2017-05-15 PROCEDURE — 92523 SPEECH SOUND LANG COMPREHEN: CPT

## 2017-05-15 PROCEDURE — 85610 PROTHROMBIN TIME: CPT | Performed by: INTERNAL MEDICINE

## 2017-05-15 PROCEDURE — 97165 OT EVAL LOW COMPLEX 30 MIN: CPT

## 2017-05-15 RX ORDER — ALPRAZOLAM 1 MG/1
1 TABLET, EXTENDED RELEASE ORAL DAILY
Status: DISCONTINUED | OUTPATIENT
Start: 2017-05-15 | End: 2017-05-15

## 2017-05-15 RX ORDER — WARFARIN SODIUM 5 MG/1
5 TABLET ORAL EVERY EVENING
Status: DISCONTINUED | OUTPATIENT
Start: 2017-05-15 | End: 2017-05-15

## 2017-05-15 RX ORDER — WARFARIN SODIUM 5 MG/1
5 TABLET ORAL EVERY EVENING
Status: COMPLETED | OUTPATIENT
Start: 2017-05-15 | End: 2017-05-15

## 2017-05-15 RX ORDER — ATORVASTATIN CALCIUM 40 MG/1
80 TABLET, FILM COATED ORAL
Status: DISCONTINUED | OUTPATIENT
Start: 2017-05-15 | End: 2017-05-17 | Stop reason: HOSPADM

## 2017-05-15 RX ORDER — SODIUM CHLORIDE 0.9 % (FLUSH) 0.9 %
5-10 SYRINGE (ML) INJECTION AS NEEDED
Status: DISCONTINUED | OUTPATIENT
Start: 2017-05-16 | End: 2017-05-17 | Stop reason: HOSPADM

## 2017-05-15 RX ORDER — ALPRAZOLAM 1 MG/1
1 TABLET ORAL ONCE
Status: COMPLETED | OUTPATIENT
Start: 2017-05-15 | End: 2017-05-15

## 2017-05-15 RX ADMIN — PANTOPRAZOLE SODIUM 40 MG: 40 TABLET, DELAYED RELEASE ORAL at 09:13

## 2017-05-15 RX ADMIN — ATORVASTATIN CALCIUM 80 MG: 40 TABLET, FILM COATED ORAL at 21:43

## 2017-05-15 RX ADMIN — WARFARIN SODIUM 5 MG: 5 TABLET ORAL at 18:29

## 2017-05-15 RX ADMIN — LEVOTHYROXINE SODIUM 25 MCG: 25 TABLET ORAL at 09:13

## 2017-05-15 RX ADMIN — ALPRAZOLAM 1 MG: 1 TABLET ORAL at 22:17

## 2017-05-15 RX ADMIN — TAMSULOSIN HYDROCHLORIDE 0.4 MG: 0.4 CAPSULE ORAL at 09:12

## 2017-05-15 NOTE — ROUTINE PROCESS
Bedside and Verbal shift change report given to Timothy Bobby RN (oncoming nurse) by Long Leone RN (offgoing nurse). Report included the following information SBAR, Kardex and Recent Results.

## 2017-05-15 NOTE — PROGRESS NOTES
[facility] Hospitalist Group  Progress Note    Patient: Artur Notice Age: 77 y.o. : 1940 MR#: [id number] SSN: [id number]  Date/Time: 5/15/2017     Subjective: pt feels fine, no speech problems now. All symptoms resolved. Assessment/Plan:   1. TIA: r/O CVA  - awaiting MRI and neuro eval. D/w Dr. Rebeka Freeman.   - Monitor on Tele   - INR 1.8 on day of admission. D/w pt about options of eliquis or xarelto, pt prefers to stay on coumadin. Explained risk of recurrent thromboembolic events with sub theraputic INR, he understands and wants to cont coumadin. 2. Chronic Afib  Continue warfarin   Continue Lopressor at 25mg daily   3. HTN: BP stabl  4. Dyslipidemia: cont statin   5. Hypothyroidism: cont synthroid         Case discussed with:  [x]Patient  []Family  [x]Nursing  []Case Management  DVT Prophylaxis:  []Lovenox  []Hep SQ  []SCDs  [x]Coumadin   []On Heparin gtt    Objective:   VS:   Visit Vitals    /79 (BP 1 Location: Left arm, BP Patient Position: At rest)    Pulse (!) 56    Temp 97.4 °F (36.3 °C)    Resp 16    Ht 5' 10\" (1.778 m)    Wt 91.8 kg (202 lb 4.8 oz)    SpO2 98%    BMI 29.03 kg/m2      Tmax/24hrs: Temp (24hrs), Av.9 °F (36.6 °C), Min:97.4 °F (36.3 °C), Max:98.3 °F (36.8 °C)  IOBRIEFNo intake or output data in the 24 hours ending 05/15/17 1426    General:  Alert, cooperative, no acute distress    Pulmonary:  CTA Bilaterally. No Wheezing/Rhonchi/Rales. Cardiovascular: Regular rate and Rhythm. GI:  Soft, Non distended, Non tender. + Bowel sounds. Extremities:  No edema, cyanosis, clubbing. Psych: Good insight. Not anxious or agitated. Neurologic: Alert and oriented X 3. No acute neuro deficits.   Additional:    Medications:   Current Facility-Administered Medications   Medication Dose Route Frequency    atorvastatin (LIPITOR) tablet 80 mg  80 mg Oral QHS    ramipril (ALTACE) capsule 10 mg  10 mg Oral DAILY    metoprolol tartrate (LOPRESSOR) tablet 25 mg  25 mg Oral BID    levothyroxine (SYNTHROID) tablet 25 mcg  25 mcg Oral ACB    tamsulosin (FLOMAX) capsule 0.4 mg  0.4 mg Oral DAILY    amLODIPine (NORVASC) tablet 5 mg  5 mg Oral DAILY    pantoprazole (PROTONIX) tablet 40 mg  40 mg Oral ACB    sodium chloride (NS) flush 5-10 mL  5-10 mL IntraVENous Q8H    sodium chloride (NS) flush 5-10 mL  5-10 mL IntraVENous PRN    WARFARIN INFORMATION NOTE (COUMADIN)   Other Q24H       Labs:    Recent Results (from the past 24 hour(s))   TSH 3RD GENERATION    Collection Time: 05/14/17  4:50 PM   Result Value Ref Range    TSH 2.98 0.36 - 3.74 uIU/mL   CARDIAC PANEL,(CK, CKMB & TROPONIN)    Collection Time: 05/14/17  4:50 PM   Result Value Ref Range     39 - 308 U/L    CK - MB 2.2 <3.6 ng/ml    CK-MB Index 1.9 0.0 - 4.0 %    Troponin-I, Qt. <0.02 0.0 - 0.045 NG/ML   PROTHROMBIN TIME + INR    Collection Time: 05/15/17 12:09 AM   Result Value Ref Range    Prothrombin time 21.4 (H) 11.5 - 15.2 sec    INR 2.0 (H) 0.8 - 1.2     METABOLIC PANEL, BASIC    Collection Time: 05/15/17 12:09 AM   Result Value Ref Range    Sodium 140 136 - 145 mmol/L    Potassium 4.0 3.5 - 5.5 mmol/L    Chloride 105 100 - 108 mmol/L    CO2 25 21 - 32 mmol/L    Anion gap 10 3.0 - 18 mmol/L    Glucose 97 74 - 99 mg/dL    BUN 16 7.0 - 18 MG/DL    Creatinine 0.93 0.6 - 1.3 MG/DL    BUN/Creatinine ratio 17 12 - 20      GFR est AA >60 >60 ml/min/1.73m2    GFR est non-AA >60 >60 ml/min/1.73m2    Calcium 9.8 8.5 - 10.1 MG/DL   CBC WITH AUTOMATED DIFF    Collection Time: 05/15/17 12:09 AM   Result Value Ref Range    WBC 6.0 4.6 - 13.2 K/uL    RBC 4.49 (L) 4.70 - 5.50 M/uL    HGB 14.8 13.0 - 16.0 g/dL    HCT 40.8 36.0 - 48.0 %    MCV 90.9 74.0 - 97.0 FL    MCH 33.0 24.0 - 34.0 PG    MCHC 36.3 31.0 - 37.0 g/dL    RDW 13.4 11.6 - 14.5 %    PLATELET 663 (L) 145 - 420 K/uL    MPV 10.4 9.2 - 11.8 FL    NEUTROPHILS 59 40 - 73 %    LYMPHOCYTES 30 21 - 52 %    MONOCYTES 7 3 - 10 %    EOSINOPHILS 3 0 - 5 % BASOPHILS 1 0 - 2 %    ABS. NEUTROPHILS 3.6 1.8 - 8.0 K/UL    ABS. LYMPHOCYTES 1.8 0.9 - 3.6 K/UL    ABS. MONOCYTES 0.4 0.05 - 1.2 K/UL    ABS. EOSINOPHILS 0.2 0.0 - 0.4 K/UL    ABS. BASOPHILS 0.1 0.0 - 0.1 K/UL    DF AUTOMATED     CARDIAC PANEL,(CK, CKMB & TROPONIN)    Collection Time: 05/15/17 12:09 AM   Result Value Ref Range     39 - 308 U/L    CK - MB 1.6 <3.6 ng/ml    CK-MB Index 1.5 0.0 - 4.0 %    Troponin-I, Qt. <0.02 0.0 - 0.045 NG/ML   LIPID PANEL    Collection Time: 05/15/17 12:09 AM   Result Value Ref Range    LIPID PROFILE          Cholesterol, total 206 (H) <200 MG/DL    Triglyceride 433 (H) <150 MG/DL    HDL Cholesterol 33 (L) 40 - 60 MG/DL    LDL, calculated  0 - 100 MG/DL     LDL AND VLDL CHOLESTEROL NOT CALCULATED WHEN TRIGLYCERIDES >400 MG/DL OR HDL CHOLESTEROL <20 MG/DL    VLDL, calculated  MG/DL     Calculation not valid with this patient's other Lipid values.     CHOL/HDL Ratio 6.2 (H) 0 - 5.0     HEMOGLOBIN A1C WITH EAG    Collection Time: 05/15/17 12:09 AM   Result Value Ref Range    Hemoglobin A1c 5.7 (H) 4.2 - 5.6 %    Est. average glucose 117 mg/dL       Signed By: Chuy Gleason MD     May 15, 2017

## 2017-05-15 NOTE — ROUTINE PROCESS
Report received from [facility] about patient expected from ED--      1905  care of patient from ED via stretcher -- patient was able to ambulate from stretcher to bathroom and then bed without difficulty--     Vitals signs taken, tele box placed and family at bedside    Primary Nurse Lainey Hagen RN and Agusto Harris RN performed a dual skin assessment on this patient No impairment noted  Jayson score is 23    Bedside and Verbal shift change report given to [employer] (oncoming nurse) by Raul Werner RN (offgoing nurse). Report given with SBAR, Kardex, Intake/Output, MAR, Accordion and Recent Results.

## 2017-05-15 NOTE — PROGRESS NOTES
NUTRITION     BPA/MST Referral       RECOMMENDATIONS / PLAN:     - No nutrition intervention indicated at this time. Will re-screen as appropriate. NUTRITION INTERVENTIONS & DIAGNOSIS:     Nutrition Diagnosis:  No nutrition diagnosis at this time    ASSESSMENT:     Subjective/Objective:  Patient reported having good appetite and meal intake PTA and since admission. Sometimes has fair po intake lunch meal. Pt on coumadin; provided educational handout on vitamin K consistent diet. Briefly reviewed information with him; pt stated already familiar with dietary considerations. family present at time of visit. Pt and family denied having any concerns at time of visit. Average po intake adequate to meet patients estimated nutritional needs:   [x] Yes     [] No   [] Unable to determine at this time    Diet: DIET CARDIAC Regular      Food Allergies:  None known   Current Appetite:   [x] Good     [] Fair     [] Poor     [] Other:  Appetite/meal intake prior to admission:   [x] Good     [] Fair     [] Poor     [] Other:  Feeding Limitations:  [] Swallowing difficulty    [] Chewing difficulty    [x] Other: SLP following; recommend regular consistency diet with thin liquids  Current Meal Intake: No data found. BM:  5/13  Skin Integrity:  No pressure ulcers or wounds  Edema:  None   Pertinent Medications: Reviewed    Recent Labs      05/15/17   0009  05/14/17   1350   NA  140  136   K  4.0  4.2   CL  105  102   CO2  25  28   GLU  97  122*   BUN  16  18   CREA  0.93  1.05   CA  9.8  9.5     No intake or output data in the 24 hours ending 05/15/17 1241    Anthropometrics:  Ht Readings from Last 1 Encounters:   05/14/17 5' 10\" (1.778 m)     Last 3 Recorded Weights in this Encounter    05/14/17 1403 05/14/17 1915   Weight: 90.7 kg (200 lb) 91.8 kg (202 lb 4.8 oz)     Body mass index is 29.03 kg/(m^2).           Weight History:  Patient denied experiencing any recent changes in weight PTA    Weight Metrics 5/14/2017 Weight 202 lb 4.8 oz   BMI 29.03 kg/m2        Admitting Diagnosis: Aphasia  Aphasia  Past Medical History:   Diagnosis Date    A-fib [facility])        Education Needs:        [] None identified  [] Identified - Not appropriate at this time  [x]  Identified and addressed - refer to education log (coumadin and consistent vitamin K diet)  Learning Limitations:   [x] None identified  [] Identified    Cultural, Jehovah's witness & ethnic food preferences:  [x] None identified    [] Identified and addressed     ESTIMATED NUTRITION NEEDS:     Calories: 0969-4782 kcal (MSJx1.2-1.3) based on  [x] Actual BW: 92 kg      [] IBW   Protein: 74-92 gm (0.8-1 gm/kg) based on  [x] Actual BW      [] IBW   Fluid: 1 mL/kcal     MONITORING & EVALUATION:     Nutrition Goal(s):   1. Po intake of meals will meet >75% of patient estimated nutritional needs within the next 7 days.   Outcome:  [] Met/Ongoing    []  Not Met    [x] New/Initial Goal     Monitoring:   [x] Diet tolerance   [x] Meal intake   [] Supplement intake   [] GI symptoms/ability to tolerate po diet   [] Respiratory status   [] Plan of care      Previous Recommendations (for follow-up assessments only):     []   Implemented       []   Not Implemented (RD to address)     [] No Recommendation Made     Discharge Planning:  Cardiac diet; consistency per SLP  [x] Participated in care planning, discharge planning, & interdisciplinary rounds as appropriate      Sean Perry, [address] [place] [address]   Pager: [phone]

## 2017-05-15 NOTE — PROGRESS NOTES
Problem: Communication Impaired (Adult)  Goal: *Acute Goals and Plan of Care (Insert Text)  SPEECH LANGUAGE PATHOLOGY EVALUATION/DISCHARGE     Patient: Donna Black (18 y.o. male)  Date: 5/15/2017  Primary Diagnosis: Aphasia  Aphasia  Precautions: None on file          ASSESSMENT :  Based on the objective data described below, the patient presents with intact functional communication and motor speech. Pt reporting improvement in speech symptoms since admission. Oral [facility] examination functional, motor speech intact. Pt with adequate breath support (able to sustain vowel for >20 seconds). Pt with intact confrontational naming, generative naming and sentence completion. Pt educated with regard to role of SLP and to seek SLP services if difficulty with speech, language, voice or swallowing deficits arise. Pt able to verbalize understanding. Will sign off. Please re-consult as indicated. PLAN :  Recommendations and Planned Interventions:  As above   Frequency/Duration: Evaluation only   Discharge Recommendations: None       SUBJECTIVE:   Patient stated I couldn't remember people's names yesterday. OBJECTIVE:       Past Medical History:   Diagnosis Date    A-fib [facility])     History reviewed. No pertinent surgical history.   Prior Level of Function/Home Situation: Home   Home Situation  Home Environment: Private residence  # Steps to Enter: 0  One/Two Story Residence: One story  Living Alone: Yes  Support Systems: Family member(s), Spouse/Significant Other/Partner  Patient Expects to be Discharged to[de-identified] Private residence  Current DME Used/Available at Home: None  Mental Status:  Neurologic State: Alert  Orientation Level: Oriented X4  Cognition: Follows commands  Motor Speech:  Oral-Motor Structure/Motor Speech  Face: No impairment  Labial: No impairment  Dentition: Intact  Lingual: No impairment  Velum: No impairment  Mandible: No impairment  Language Comprehension and Expression:  Verbal Expression  Primary Mode of Expression: Verbal  Automatic Speech Task: No impairment  Repetition: No impairment  Naming: No impairment (Generative, confrontational naming intact)  Sentence Completion: No impairment  Conversation: No impairment  Voice:  Vocal Quality: No impairment     SPEECH GCODE  Expression:  Current  CH= 0%   Goals  CH= 0%   D/C  CH= 0%     The severity rating is based on the following outcomes:             Clinical judgment      PAIN:  Pt reports 0/10 pain or discomfort prior to evaluation. Pt reports 0/10 pain or discomfort post evaluation. After treatment:   [X]              Patient left in no apparent distress sitting up in chair  [ ]              Patient left in no apparent distress in bed  [X]              Call bell left within reach  [X]              Nursing notified  [ ]              Caregiver present  [ ]              Bed alarm activated      COMMUNICATION/EDUCATION:   [X] Patient educated regarding role of SLP; Pt able to verbalize understanding.       Thank you for this referral.  Valentin Joy M.S., [id number] [facility]  Speech-Language Pathologist

## 2017-05-15 NOTE — PROGRESS NOTES
Rec'ed message regarding adding Condition 44 to chart. BELIA Henry & she will ask Elisa Sheikh RN to add it.

## 2017-05-15 NOTE — PROGRESS NOTES
Problem: Dysphagia (Adult)  Goal: *Acute Goals and Plan of Care (Insert Text)  Pt will: Tolerate po with no overt s/sx aspiration in 4/5 trials. Recommend:  Continue regular diet with thin liquids  Meds as tolerated   General safe swallow precautions  Outcome: Resolved/Met Date Met:  05/15/17  SPEECH LANGUAGE PATHOLOGY BEDSIDE SWALLOW EVALUATION AND DISCHARGE     Patient: Rain Giordano (1 y.o. male)  Date: 5/15/2017  Primary Diagnosis: Aphasia  Aphasia  Precautions: None on file          ASSESSMENT :  Clinical beside swallow eval completed per MD orders. Pt A&Ox4. Oral mech examination revealed structures functional for speech and deglutition. Pt observed with thin liquids +/- straw via single sips and successive swallows with timely swallow initiation, adequate laryngeal elevation to palpation and no overt s/sx aspiration. Pt also observed with med pass with RN present, tolerating multiple pills + thin liquid with no overt s/sx aspiration. Pt demo positive rotary chew and thorough oral clearance with regular solids with no overt s/sx aspiration. Pt safe for regular diet with thin liquids, meds as tolerated with general safe swallow precautions. Pt educated with regard to s/sx aspiration, aspiration risk, diet recs and role of SLP. Pt able to verbalize understanding. Will sign off. Please re-consult as indicated. D/w RN. PLAN :  Recommendations and Planned Interventions:  No formal ST needs ID'd for dysphagia. Eval only. Discharge Recommendations: None       SUBJECTIVE:   Patient stated I feel better today. OBJECTIVE:       Past Medical History:   Diagnosis Date    A-fib [facility])     History reviewed. No pertinent surgical history.   Prior Level of Function/Home Situation: Home  Home Situation  Home Environment: Private residence  # Steps to Enter: 0  One/Two Story Residence: One story  Living Alone: Yes  Support Systems: Family member(s), Spouse/Significant Other/Partner  Patient Expects to be Discharged to[de-identified] Private residence  Current DME Used/Available at Home: None  Diet prior to admission: Regular, thin liquids  Current Diet:  Regular, thin liquids    Cognitive and Communication Status:  Neurologic State: Alert  Orientation Level: Oriented X4  Cognition: Follows commands  Oral Assessment:  Oral Assessment  Labial: No impairment  Dentition: Intact  Lingual: No impairment  Velum: No impairment  Mandible: No impairment  P.O. Trials:  Patient Position: 90 in chair  Vocal quality prior to P.O.: No impairment  Consistency Presented: Thin liquid; Solid;Pill/Tablet  How Presented: Self-fed/presented;Cup/sip;Spoon;Straw;Successive swallows  Bolus Acceptance: No impairment  Bolus Formation/Control: No impairment  Propulsion: No impairment  Oral Residue: None  Initiation of Swallow: No impairment  Laryngeal Elevation: Functional  Aspiration Signs/Symptoms: None  Pharyngeal Phase Characteristics: No impairment, issues, or problems   Effective Modifications: None  Oral Phase Severity: No impairment  Pharyngeal Phase Severity : No impairment     GCODESwallowing:  Swallow Current Status CH= 0%   Swallow Goal Status CH= 0%   Swallow D/C Status CH= 0%     The severity rating is based on the following outcomes:             Clinical judgment     Pain:  Pt reports 0/10 pain or discomfort prior to eval.   Pt reports 0/10 pain or discomfort post eval.      After treatment:   [X]            Patient left in no apparent distress sitting up in chair  [ ]            Patient left in no apparent distress in bed  [X]            Call bell left within reach  [X]            Nursing notified  [ ]            Caregiver present  [ ]            Bed alarm activated      COMMUNICATION/EDUCATION:   [X]            SLP educated pt with regard to diet recs, aspiration s/sx, aspiration risk and general safe swallow precautions. Pt able to verbalize understanding.       Thank you for this referral.  Estela Simmons M.S., [id number] [facility]  Speech-Language Pathologist

## 2017-05-15 NOTE — PROGRESS NOTES
Problem: Self Care Deficits Care Plan (Adult)  Goal: *Acute Goals and Plan of Care (Insert Text)  OCCUPATIONAL THERAPY EVALUATION/DISCHARGE     Patient: Adalgisa Jade (3 y.o. male)  Date: 5/15/2017  Primary Diagnosis: Aphasia  Aphasia   Precautions: none listed         ASSESSMENT AND RECOMMENDATIONS:  Based on the objective data described below, the patient presents with out functional deficits; therefore, skilled occupational therapy is not indicated at this time. Discharge Recommendations: None  Further Equipment Recommendations for Discharge: N/A       COMPLEXITY      Eval Complexity: History: LOW Complexity : Brief history review ; Examination: LOW Complexity : 1-3 performance deficits relating to physical, cognitive , or psychosocial skils that result in activity limitations and / or participation restrictions ; Decision Making:LOW Complexity : No comorbidities that affect functional and no verbal or physical assistance needed to complete eval tasks  Assessment: LOW Complexity          G-CODES:      Self Care  Current  CH= 0%   Goal  CH= 0%   D/C  CH= 0%. The severity rating is based on the Level of Assistance required for Functional Mobility and ADLs. SUBJECTIVE:   Patient stated I think I'm all better.       OBJECTIVE DATA SUMMARY:       Past Medical History:   Diagnosis Date    A-fib [facility])     History reviewed. No pertinent surgical history.   Prior Level of Function/Home Situation:   Home Situation  Home Environment: Private residence  # Steps to Enter: 0  One/Two Story Residence: One story  Living Alone: Yes  Support Systems: Family member(s), Spouse/Significant Other/Partner  Patient Expects to be Discharged to[de-identified] Private residence  Current DME Used/Available at Home: None  [X]     Right hand dominant       [ ]     Left hand dominant  Cognitive/Behavioral Status:  Neurologic State: Alert  Orientation Level: Oriented X4  Skin: no skin integrity issues noted during OT evaluation  Edema: no extremity edema noted  Vision/Perceptual:       Tracking is WFL    Coordination:  SUNNY's WFL  Balance:  Independent sitting for LB dressing and independent standing for LB clothes management  Strength:  SUNNY's: 4 to 4+/5 (RUE with slight weakness that does not appear to impair function)  Tone & Sensation:  SUNNY's WFL  Range of Motion:  Latrell AROM is [facility]  Functional Mobility and Transfers for ADLs:  Bed Mobility:   up in chair upon arrival; do not expect bed mobility issues secondary to his high level of upright mobility and his and his family's self report   Transfers:  Sit to stand and taking side steps in preparation for transferring is independent  ADL Assessment:   self feeding: independent (simulated)  Grooming: independent (simulated)  UB bathing/dressing: independent (simulated)  LB bathing/dressing: independent   Pain:  Pt reports 0/10 pain or discomfort prior to treatment. Pt reports 0/10 pain or discomfort post treatment. Activity Tolerance:   No SOB noted and no reports of generalized fatigue noted  Please refer to the flowsheet for vital signs taken during this treatment. After treatment:   [X]  Patient left in no apparent distress sitting up in chair  [ ]  Patient left in no apparent distress in bed  [ ]  Call bell left within reach  [ ]  Nursing notified  [X]  3 family members present  [ ]  Bed alarm activated      COMMUNICATION/EDUCATION:   Communication/Collaboration:  [ ]      Home safety education was provided and the patient/caregiver indicated understanding. [X]      Patient/family have participated as able and agree with findings and recommendations. [ ]      Patient is unable to participate in plan of care at this time.      Preston Chowdary OTR/L  Time Calculation: 8 mins

## 2017-05-15 NOTE — PROGRESS NOTES
Problem: Mobility Impaired (Adult and Pediatric)  Goal: *Acute Goals and Plan of Care (Insert Text)  Outcome: Resolved/Met Date Met:  05/15/17  PHYSICAL THERAPY EVALUATION & DISCHARGE     Patient: Benja Garcia (21 y.o. male)  Date: 5/15/2017  Primary Diagnosis: Aphasia  Aphasia        Precautions: None      ASSESSMENT AND RECOMMENDATIONS:  Pt is 78yo M admitted to hospital for aphasia and presents today alert and agreeable to therapy. Pt reports his sx have resolved and he transferred sit to stand with modified independence. Pt amb 250ft independently and navigated 4 steps with HR at modified independent before transferring back into locked recliner. Pt currently is functioning at modified independent level and skilled physical therapy is not indicated at this time. Pt agreeable to D/C from PT at this time. Discharge Recommendations: Home  Further Equipment Recommendations for Discharge: N/A        G-:CODES      Mobility  Current  CH= 0%   Goal  CH= 0%  D/C  CH= 0%. The severity rating is based on the Level of Assistance required for Functional Mobility and ADLs. Evaluation Complexity      Eval Complexity: History: MEDIUM  Complexity : 1-2 comorbidities / personal factors will impact the outcome/ POC Exam:LOW Complexity : 1-2 Standardized tests and measures addressing body structure, function, activity limitation and / or participation in recreation  Presentation: LOW Complexity : Stable, uncomplicated  Clinical Decision Making:Low Complexity   Overall Complexity:LOW       SUBJECTIVE:   Patient stated I feel pretty normal.      OBJECTIVE DATA SUMMARY:       Past Medical History:   Diagnosis Date    A-fib [facility])     History reviewed. No pertinent surgical history. Barriers to Learning/Limitations: None  Compensate with: visual, verbal, tactile, kinesthetic cues/model  Prior Level of Function/Home Situation: Pt lives alone in 1 story home with 3STE with HR.  Pt was independent with mobility and I/ADL's and has no AD/DME at home. Home Situation  Home Environment: Private residence  # Steps to Enter: 0  One/Two Story Residence: One story  Living Alone: Yes  Support Systems: Family member(s), Spouse/Significant Other/Partner  Patient Expects to be Discharged to[de-identified] Private residence  Current DME Used/Available at Home: None  Critical Behavior:    Pleasant, cooperative, A&Ox4  Strength:    Strength: Within functional limits (BLE)   Tone & Sensation:   Tone: Normal (BLE)   Sensation: Intact (BLE to LT)   Range Of Motion:  AROM: Within functional limits (BLE)   Functional Mobility:  Bed Mobility:   Scooting: Modified independent  Transfers:  Sit to Stand: Modified independent  Stand to Sit: Modified independent      Balance:   Sitting: Intact  Standing: Intact  Ambulation/Gait Training:  Distance (ft): 250 Feet (ft)  Assistive Device:  (None)  Ambulation - Level of Assistance: Independent   4 steps with HR at modified independent     Pain:  Pt reports 0/10 pain or discomfort prior to treatment. Pt reports 0/10 pain or discomfort post treatment. Activity Tolerance:   Pt tolerated activity well and reports being at his baseline. Please refer to the flowsheet for vital signs taken during this treatment. After treatment:   [X]         Patient left in no apparent distress sitting up in chair  [ ]         Patient left in no apparent distress in bed  [X]         Call bell left within reach  [ ]         Nursing notified  [X]         Caregiver present  [ ]         Bed alarm activated      COMMUNICATION/EDUCATION:   [X]         Fall prevention education was provided and the patient/caregiver indicated understanding. [X]         Patient/family have participated as able in goal setting and plan of care. [X]         Patient/family agree to work toward stated goals and plan of care. [ ]         Patient understands intent and goals of therapy, but is neutral about his/her participation.   [ ] Patient is unable to participate in goal setting and plan of care.      Thank you for this referral.  Prabhakar Cee, PT   Time Calculation: 8 mins

## 2017-05-15 NOTE — INTERDISCIPLINARY ROUNDS
Interdisciplinary STROKE Rounds       Recommendations:     Recommendations are as follows:   1. Statin order, done by Dr. Starr Pride based upon triglyceride levels of 433  2. Give stroke book and patient/family education    Tests for Today: MRI/MRA,   Discharge Plan: TBD    Core Measure Review:     Antithrombotic by Day 2:  warfarin   Statin:  Lipitor 80 mg   VTE Prophylaxis:  warfarin   Anticoagulant for a-fib (if indicated):  warfarin   Dysphagia Screen:  Done in ED   Therapies:       1. Physical Therapy consult:  ordered        2. Occupational Therapy consult:  ordered        3. Speech Therapy consult:   ordered   Stroke Care Plan: On chart   Stroke Education Book Given: Needs to be done   Current NIHSS 0   Advanced Imaging Results Ordered for today     Situation     Gabrielle Lennon is a 77 y.o. male admitted for evaluation of and treatment of acute confusion and memory loss, resolved. Hospital day: 0         Background     Past Medical History:   Diagnosis Date    A-fib [facility])        Social History     Social History    Marital status:      Spouse name: N/A    Number of children: N/A    Years of education: N/A     Occupational History    Not on file.      Social History Main Topics    Smoking status: Never Smoker    Smokeless tobacco: Not on file    Alcohol use No    Drug use: Not on file    Sexual activity: Not on file     Other Topics Concern    Not on file     Social History Narrative    No narrative on file       Assessment:     Active Problems:    Aphasia (5/14/2017)      TIA (transient ischemic attack) (5/14/2017)      Essential hypertension (5/14/2017)      Chronic atrial fibrillation (HCC) (5/14/2017)      Acquired hypothyroidism (5/14/2017)        Patient Vitals for the past 12 hrs:   Temp Pulse Resp BP SpO2   05/15/17 0800 97.4 °F (36.3 °C) (!) 56 16 159/79 98 %   05/15/17 0600 97.8 °F (36.6 °C) 94 16 (!) 167/114 97 %   05/15/17 0400 97.9 °F (36.6 °C) 89 16 (!) 161/111 96 %   05/15/17 0200 97.8 °F (36.6 °C) 80 16 (!) 164/119 97 %       Labs  :   Lab Results   Component Value Date/Time    Hemoglobin A1c 5.7 05/15/2017 12:09 AM      Lab Results   Component Value Date/Time    HDL 33 (L) 05/15/2017 12:09 AM      aPTT   Date Value Ref Range Status   05/14/2017 42.6 (H) 23.0 - 36.4 SEC Final     INR   Date Value Ref Range Status   05/15/2017 2.0 (H) 0.8 - 1.2   Final     Comment:                INR Therapeutic Ranges         (on stable oral anticoagulant):     INDICATION                INR  DVT/PE/Atrial Fib          2.0-3.0  MI/Mechanical Heart Valve  2.5-3.5       Lab Results   Component Value Date     05/15/2017    CO2 25 05/15/2017    BUN 16 05/15/2017         Discipline Participation:   Interdisciplinary rounds were conducted by:  Dr. Ashley Basurto, Neuroscience Medical director,   Imani Rocha RN, stroke coordinator  Nancy Johnson SLP, and   Long Leone RN, the patient's nurse.

## 2017-05-16 ENCOUNTER — APPOINTMENT (OUTPATIENT)
Dept: MRI IMAGING | Age: 77
End: 2017-05-16
Attending: INTERNAL MEDICINE
Payer: MEDICARE

## 2017-05-16 ENCOUNTER — APPOINTMENT (OUTPATIENT)
Dept: MRI IMAGING | Age: 77
End: 2017-05-16
Attending: PSYCHIATRY & NEUROLOGY
Payer: MEDICARE

## 2017-05-16 LAB
CK MB CFR SERPL CALC: 2.6 % (ref 0–4)
CK MB SERPL-MCNC: 2.1 NG/ML (ref 5–25)
CK SERPL-CCNC: 82 U/L (ref 39–308)
GLUCOSE BLD STRIP.AUTO-MCNC: 115 MG/DL (ref 70–110)
GLUCOSE BLD STRIP.AUTO-MCNC: 128 MG/DL (ref 70–110)
INR PPP: 1.8 (ref 0.8–1.2)
PROTHROMBIN TIME: 20.3 SEC (ref 11.5–15.2)
T4 FREE SERPL-MCNC: 1.1 NG/DL (ref 0.7–1.5)
TROPONIN I SERPL-MCNC: <0.02 NG/ML (ref 0–0.04)
TSH SERPL DL<=0.05 MIU/L-ACNC: 3.38 UIU/ML (ref 0.36–3.74)

## 2017-05-16 PROCEDURE — 77030021566 MRA NECK W WO CONT

## 2017-05-16 PROCEDURE — 70544 MR ANGIOGRAPHY HEAD W/O DYE: CPT

## 2017-05-16 PROCEDURE — 82962 GLUCOSE BLOOD TEST: CPT

## 2017-05-16 PROCEDURE — 99218 HC RM OBSERVATION: CPT

## 2017-05-16 PROCEDURE — 85610 PROTHROMBIN TIME: CPT | Performed by: INTERNAL MEDICINE

## 2017-05-16 PROCEDURE — 74011250636 HC RX REV CODE- 250/636: Performed by: HOSPITALIST

## 2017-05-16 PROCEDURE — 74011000250 HC RX REV CODE- 250: Performed by: INTERNAL MEDICINE

## 2017-05-16 PROCEDURE — 70553 MRI BRAIN STEM W/O & W/DYE: CPT

## 2017-05-16 PROCEDURE — 74011250637 HC RX REV CODE- 250/637: Performed by: HOSPITALIST

## 2017-05-16 PROCEDURE — 96372 THER/PROPH/DIAG INJ SC/IM: CPT

## 2017-05-16 PROCEDURE — 74011250637 HC RX REV CODE- 250/637: Performed by: PSYCHIATRY & NEUROLOGY

## 2017-05-16 PROCEDURE — 84439 ASSAY OF FREE THYROXINE: CPT | Performed by: HOSPITALIST

## 2017-05-16 PROCEDURE — 84443 ASSAY THYROID STIM HORMONE: CPT | Performed by: HOSPITALIST

## 2017-05-16 PROCEDURE — 74011250637 HC RX REV CODE- 250/637: Performed by: INTERNAL MEDICINE

## 2017-05-16 PROCEDURE — 36415 COLL VENOUS BLD VENIPUNCTURE: CPT | Performed by: INTERNAL MEDICINE

## 2017-05-16 PROCEDURE — 82550 ASSAY OF CK (CPK): CPT | Performed by: HOSPITALIST

## 2017-05-16 PROCEDURE — 93306 TTE W/DOPPLER COMPLETE: CPT

## 2017-05-16 RX ORDER — TAMSULOSIN HYDROCHLORIDE 0.4 MG/1
0.4 CAPSULE ORAL DAILY
COMMUNITY

## 2017-05-16 RX ORDER — ENOXAPARIN SODIUM 100 MG/ML
40 INJECTION SUBCUTANEOUS EVERY 24 HOURS
Status: DISCONTINUED | OUTPATIENT
Start: 2017-05-16 | End: 2017-05-17 | Stop reason: HOSPADM

## 2017-05-16 RX ORDER — WARFARIN SODIUM 5 MG/1
5 TABLET ORAL DAILY
COMMUNITY
End: 2017-05-17

## 2017-05-16 RX ORDER — SODIUM CHLORIDE 9 MG/ML
10 INJECTION INTRAMUSCULAR; INTRAVENOUS; SUBCUTANEOUS
Status: COMPLETED | OUTPATIENT
Start: 2017-05-16 | End: 2017-05-16

## 2017-05-16 RX ORDER — PRAVASTATIN SODIUM 20 MG/1
20 TABLET ORAL
COMMUNITY
End: 2017-05-17

## 2017-05-16 RX ORDER — METOPROLOL TARTRATE 25 MG/1
12.5 TABLET, FILM COATED ORAL EVERY 12 HOURS
Status: DISCONTINUED | OUTPATIENT
Start: 2017-05-17 | End: 2017-05-17 | Stop reason: HOSPADM

## 2017-05-16 RX ORDER — METOPROLOL SUCCINATE 50 MG/1
TABLET, EXTENDED RELEASE ORAL DAILY
COMMUNITY
End: 2017-05-17

## 2017-05-16 RX ORDER — WARFARIN 3 MG/1
6 TABLET ORAL EVERY EVENING
Status: COMPLETED | OUTPATIENT
Start: 2017-05-16 | End: 2017-05-16

## 2017-05-16 RX ORDER — AMLODIPINE BESYLATE 5 MG/1
5 TABLET ORAL DAILY
COMMUNITY
End: 2017-05-17

## 2017-05-16 RX ORDER — LEVOTHYROXINE SODIUM 25 UG/1
25 TABLET ORAL
COMMUNITY

## 2017-05-16 RX ORDER — RAMIPRIL 10 MG/1
10 CAPSULE ORAL DAILY
COMMUNITY

## 2017-05-16 RX ORDER — PROBENECID AND COLCHICINE 500; .5 MG/1; MG/1
1 TABLET ORAL DAILY
COMMUNITY

## 2017-05-16 RX ADMIN — LEVOTHYROXINE SODIUM 25 MCG: 25 TABLET ORAL at 07:04

## 2017-05-16 RX ADMIN — AMLODIPINE BESYLATE 5 MG: 5 TABLET ORAL at 08:43

## 2017-05-16 RX ADMIN — TAMSULOSIN HYDROCHLORIDE 0.4 MG: 0.4 CAPSULE ORAL at 08:43

## 2017-05-16 RX ADMIN — WARFARIN SODIUM 6 MG: 3 TABLET ORAL at 17:43

## 2017-05-16 RX ADMIN — RAMIPRIL 10 MG: 5 CAPSULE ORAL at 08:42

## 2017-05-16 RX ADMIN — METOPROLOL TARTRATE 25 MG: 25 TABLET ORAL at 08:43

## 2017-05-16 RX ADMIN — ENOXAPARIN SODIUM 40 MG: 40 INJECTION SUBCUTANEOUS at 15:32

## 2017-05-16 RX ADMIN — PANTOPRAZOLE SODIUM 40 MG: 40 TABLET, DELAYED RELEASE ORAL at 07:04

## 2017-05-16 RX ADMIN — SODIUM CHLORIDE 10 ML: 9 INJECTION INTRAMUSCULAR; INTRAVENOUS; SUBCUTANEOUS at 11:24

## 2017-05-16 RX ADMIN — ATORVASTATIN CALCIUM 80 MG: 40 TABLET, FILM COATED ORAL at 22:45

## 2017-05-16 NOTE — PROGRESS NOTES
Care Management Interventions  PCP Verified by CM: Yes (last seen \"3 months ago\")  Transition of Care Consult (CM Consult): Discharge Planning  Physical Therapy Consult: Yes  Occupational Therapy Consult: Yes  Speech Therapy Consult: Yes  Current Support Network: Lives Alone, Family Lives Nearby  Confirm Follow Up Transport: Family  Plan discussed with Pt/Family/Caregiver: Yes    Pt is a 77year old admitted for aphasia. Pt is alert and oriented and alone in room. Pt lives alone and his son Silvina Gray ([phone]) lives nearby. Pt is independent in his ADLs and ambulation and has a cane at home and has transportation home with family upon discharge. Pt signed the PRABHAKAR and observation forms and original placed in chart. Copy given to pt. CM will continue to monitor for discharge needs.

## 2017-05-16 NOTE — CONSULTS
[facility] - Consult Note    Consultation request by Antwan Garrido MD for advice/opinion related to evaluating bradycardia. Date of  Admission: 5/14/2017  1:47 PM   Primary Care Physician:  Mariann Fernández MD     Assessment:     Patient Active Problem List   Diagnosis Code    Aphasia R47.01    TIA (transient ischemic attack) G45.9    Essential hypertension I10    Chronic atrial fibrillation (HCC) I48.2    Acquired hypothyroidism E03.9     -Admitted for TIA symptoms. MRI pending.   -Persistent  atrial fibrillation, on BB and chronic coumadin. INR sub-therapeutic currently, but historically his INR has been fairly consistent followed by [facility]. S/p unsuccessful afib ablation 6/2016. EF wnl on echo today. -Tachy/ronald syndrome ? HR fluctuating from 115-40's on tele. 2.5 second pauses intermittently. Asymptomatic. This was after receiving metoprolol XL 50 mg this am.  -Hx of bradycardia  -S/p afib ablation June 2016  -HTN  -HLD  -Hypothyroidism, on synthroid, TSH normal this admission. Plan:     -will start on low dose Lopressor 12.5 mg bid tomorrow am, ok to continue amlodipine if needed for BP control  -Monitor overnight on tele. -Continue coumadin and trend INR. Goal 2.0-3.0  -If symptomatic and having significant pauses may benefit from PPM in the future, but would prefer in outpatient setting when able to stop his [address] from several days. Will re-evaluate tomorrow after his am mitchell of Lopressor     History of Present Illness: This is a 77 y.o. male admitted for Aphasia  Aphasia. Patient complains of:  Speech disturbance. Pt admitted on 5/14 with complaints of possible CVA/TIA symptoms with difficulty with speech and confusion. Pt resides in [address] [place] [address]. His primary cardiologist is in [place]. States his primary cardiologist just retired so he has started to see a new one and has gone to one office appointment.  At this juncture, he cannot remember the cardiologist's name. Pt has been on chronic coumadin therapy for atrial fibrillation/stroke prophylaxis. Pt reports his INR has been on the low side even with his compliance. States he has been asking his provider to increase his coumadin dosing but she hasn't. Pt had an atrial fibrillation ablation at [facility] in June of last year and states \"I've been having problems ever since. \" He's unable to clarify to me what these problems have been. States he has been on lopressor and doses have been adjusted for slow heart rate before but not recently. Cardiac risk factors:  HTN      Review of Symptoms:  Except as stated above include:  Constitutional:  Negative for fevers/chills  Respiratory:  Negative for sob/cough/congestion  Cardiovascular:  Negative for chest pain or palpitations  Gastrointestinal: negative for abd pain, nausea and vomiting  Genitourinary:  Negative for hematuria  Musculoskeletal:  Negative for muscle aches and joint pains  Neurological:  Negative for dizziness or syncope  Dermatological:  Negative for itching  Endocrinological: Negative for hot flashes  Psychological:  Negative for anxiety        Past Medical History:     Past Medical History:   Diagnosis Date    A-fib [facility])          Social History:     Social History     Social History    Marital status:      Spouse name: N/A    Number of children: N/A    Years of education: N/A     Social History Main Topics    Smoking status: Never Smoker    Smokeless tobacco: None    Alcohol use No    Drug use: None    Sexual activity: Not Asked     Other Topics Concern    None     Social History Narrative    None        Family History:   History reviewed. No pertinent family history.      Medications:   No Known Allergies     Current Facility-Administered Medications   Medication Dose Route Frequency    enoxaparin (LOVENOX) injection 40 mg  40 mg SubCUTAneous Q24H    warfarin (COUMADIN) tablet 6 mg  6 mg Oral QPM    atorvastatin (LIPITOR) tablet 80 mg  80 mg Oral QHS    sodium chloride (NS) flush 5-10 mL  5-10 mL IntraVENous PRN    ramipril (ALTACE) capsule 10 mg  10 mg Oral DAILY    levothyroxine (SYNTHROID) tablet 25 mcg  25 mcg Oral ACB    tamsulosin (FLOMAX) capsule 0.4 mg  0.4 mg Oral DAILY    pantoprazole (PROTONIX) tablet 40 mg  40 mg Oral ACB    sodium chloride (NS) flush 5-10 mL  5-10 mL IntraVENous PRN    WARFARIN INFORMATION NOTE (COUMADIN)   Other Q24H         Physical Exam:     Visit Vitals    /82 (BP 1 Location: Left arm, BP Patient Position: At rest)    Pulse (!) 51    Temp 97.3 °F (36.3 °C)    Resp 20    Ht 5' 10\" (1.778 m)    Wt 91.1 kg (200 lb 13.4 oz)    SpO2 98%    BMI 28.82 kg/m2     BP Readings from Last 3 Encounters:   05/16/17 122/82     Pulse Readings from Last 3 Encounters:   05/16/17 (!) 51     Wt Readings from Last 3 Encounters:   05/16/17 91.1 kg (200 lb 13.4 oz)   05/15/17 91.8 kg (202 lb 6.1 oz)   05/14/17 90.7 kg (199 lb 15.3 oz)       General:  Awake, alert, oriented x 3   Neck:  Supple, no jvd  Lungs: clear to auscultation bilaterally   Heart:  Tachycardic, irregular rhythm  Abdomen:  Soft, non-tender  Extremities:  No LE edema, atraumatic  Skin: warm and dry  Neuro: no focal deficits  Psych: normal mood and affect     Data Review:     Recent Labs      05/15/17   0009  05/14/17   1350   WBC  6.0  6.0   HGB  14.8  15.6   HCT  40.8  43.1   PLT  124*  117*     Recent Labs      05/16/17   0355  05/15/17   0009  05/14/17   1350   NA   --   140  136   K   --   4.0  4.2   CL   --   105  102   CO2   --   25  28   GLU   --   97  122*   BUN   --   16  18   CREA   --   0.93  1.05   CA   --   9.8  9.5   INR  1.8*  2.0*  1.8*       Results for orders placed or performed during the hospital encounter of 05/14/17   EKG, 12 LEAD, INITIAL   Result Value Ref Range    Ventricular Rate 52 BPM    Atrial Rate 45 BPM    QRS Duration 92 ms    Q-T Interval 448 ms    QTC Calculation (Bezet) 416 ms    Calculated R Axis 37 degrees    Calculated T Axis 39 degrees    Diagnosis       Atrial fibrillation with slow ventricular response  Incomplete right bundle branch block  Abnormal ECG    Confirmed by Lawanda Kumar MD, Deidra Jhon ([id number]) on 5/14/2017 4:30:32 PM         All Cardiac Markers in the last 24 hours:  No results found for: CPK, CKMMB, CKMB, RCK3, CKMBT, CKNDX, CKND1, ART, TROPT, TROIQ, JIMMY, TROPT, TNIPOC, BNP, BNPP    Last Lipid:    Lab Results   Component Value Date/Time    Cholesterol, total 206 05/15/2017 12:09 AM    HDL Cholesterol 33 05/15/2017 12:09 AM    LDL, calculated  05/15/2017 12:09 AM     LDL AND VLDL CHOLESTEROL NOT CALCULATED WHEN TRIGLYCERIDES >400 MG/DL OR HDL CHOLESTEROL <20 MG/DL    Triglyceride 433 05/15/2017 12:09 AM    CHOL/HDL Ratio 6.2 05/15/2017 12:09 AM       Signed By: ARELY Adams     May 16, 2017      Dannielle Perez MD

## 2017-05-16 NOTE — PROGRESS NOTES
[facility] Hospitalist Group  Progress Note    Patient: Layla Barreto Age: 77 y.o. : 1940 MR#: [id number] SSN: [id number]  Date/Time: 2017     Subjective: pt feels fine, had some dizziness earlier today but no complaints now. no speech problems now. Tele pt had episodes bradycardiac up to 38 today and pause up to 2.1 sec. Tele shows episodes of ronald and tachy. Couldn't do MRI this am due agitation from xanax. Assessment/Plan:   1. TIA: r/O CVA  - awaiting MRI and neuro eval. D/w Dr. Dinorah Kirk.  - INR 1.8 today and on day of admission. D/w pt about options of eliquis or xarelto, pt prefers to stay on coumadin. Explained risk of recurrent thromboembolic events with sub theraputic INR, he understands and wants to cont coumadin. INR 1.8 today, increase coumadin. 2. Chronic Afib with slow RVR: HR low to 38's and pause. Continue warfarin, hold Lopressor and amlodipine. Get TFT and CE. 3. HTN: BP stable. Will hold med's and monitor  4. Dyslipidemia: cont statin   5. Hypothyroidism: cont synthroid. Will get TFT's. D/w pt and son Rodrigo Dhillon [phone] in detail.    D/w cardio team     I spent 30 minutes with the patient in face-to-face consultation, of which greater than 50% was spent in counseling and coordination of care as described above         Case discussed with:  [x]Patient  [x]Family  [x]Nursing  []Case Management  DVT Prophylaxis:  [x]Lovenox  []Hep SQ  []SCDs  [x]Coumadin   []On Heparin gtt    Objective:   VS:   Visit Vitals    /82 (BP 1 Location: Left arm, BP Patient Position: At rest)    Pulse (!) 51    Temp 97.3 °F (36.3 °C)    Resp 20    Ht 5' 10\" (1.778 m)    Wt 91.1 kg (200 lb 13.4 oz)    SpO2 98%    BMI 28.82 kg/m2      Tmax/24hrs: Temp (24hrs), Av.7 °F (36.5 °C), Min:97.3 °F (36.3 °C), Max:98.5 °F (36.9 °C)  IOBRIEF    Intake/Output Summary (Last 24 hours) at 17 1503  Last data filed at 17 1226   Gross per 24 hour   Intake 480 ml   Output                0 ml   Net              480 ml       General:  Alert, cooperative, no acute distress    Pulmonary:  CTA Bilaterally. Cardiovascular: IRRegular rate and Rhythm. GI:  Soft, Non distended, Non tender. + Bowel sounds. Extremities:  No edema, cyanosis, clubbing. Psych: Good insight. Not anxious or agitated. Neurologic: Alert and oriented X 3. No acute neuro deficits.       Medications:   Current Facility-Administered Medications   Medication Dose Route Frequency    enoxaparin (LOVENOX) injection 40 mg  40 mg SubCUTAneous Q24H    warfarin (COUMADIN) tablet 6 mg  6 mg Oral QPM    atorvastatin (LIPITOR) tablet 80 mg  80 mg Oral QHS    sodium chloride (NS) flush 5-10 mL  5-10 mL IntraVENous PRN    ramipril (ALTACE) capsule 10 mg  10 mg Oral DAILY    levothyroxine (SYNTHROID) tablet 25 mcg  25 mcg Oral ACB    tamsulosin (FLOMAX) capsule 0.4 mg  0.4 mg Oral DAILY    pantoprazole (PROTONIX) tablet 40 mg  40 mg Oral ACB    sodium chloride (NS) flush 5-10 mL  5-10 mL IntraVENous PRN    WARFARIN INFORMATION NOTE (COUMADIN)   Other Q24H       Labs:    Recent Results (from the past 24 hour(s))   GLUCOSE, POC    Collection Time: 05/15/17  4:45 PM   Result Value Ref Range    Glucose (POC) 144 (H) 70 - 110 mg/dL   PROTHROMBIN TIME + INR    Collection Time: 05/16/17  3:55 AM   Result Value Ref Range    Prothrombin time 20.3 (H) 11.5 - 15.2 sec    INR 1.8 (H) 0.8 - 1.2     GLUCOSE, POC    Collection Time: 05/16/17  7:07 AM   Result Value Ref Range    Glucose (POC) 115 (H) 70 - 110 mg/dL   GLUCOSE, POC    Collection Time: 05/16/17 11:43 AM   Result Value Ref Range    Glucose (POC) 128 (H) 70 - 110 mg/dL       Signed By: Messi Yeager MD     May 16, 2017

## 2017-05-16 NOTE — CONSULTS
Mel Ingram is a 77 y.o., right handed male, with an established history of atrial fibrillation, post ablation, but still in a fib, history of hypertension, who wsa in his usual state of good health, when on  before admission he was unable to remember any names. He couldn't remember the names of his grandchildren, which he certainly knew prior to th event. He may have had some confused speech, but knew what was going on about him. He denies any weakness in the extremities. No vision changes. The symptoms lasted paula than 90 minutes and he feels completely back to baseline. He's never ahd a stroke or seizure before. Social History; he's single, lives alone. He denies smoking, drinking or illicit drugs use. Family History; father  from cardiac disease. Mother  from lung cancer. Siblings 2 sisters with lung cancer. Brother in fair health, has macular degeneration.       Current Facility-Administered Medications   Medication Dose Route Frequency Provider Last Rate Last Dose    enoxaparin (LOVENOX) injection 40 mg  40 mg SubCUTAneous Q24H Jamie Graham MD   40 mg at 17 1532    warfarin (COUMADIN) tablet 6 mg  6 mg Oral QPM Jamie Graham MD        atorvastatin (LIPITOR) tablet 80 mg  80 mg Oral QHS Adan Bowles MD   80 mg at 05/15/17 2143    sodium chloride (NS) flush 5-10 mL  5-10 mL IntraVENous PRN Zen Knox MD        ramipril (ALTACE) capsule 10 mg  10 mg Oral DAILY Zen Knox MD   10 mg at 17 0842    levothyroxine (SYNTHROID) tablet 25 mcg  25 mcg Oral ACB Zen Knox MD   25 mcg at 17 0704    tamsulosin (FLOMAX) capsule 0.4 mg  0.4 mg Oral DAILY Zen Knox MD   0.4 mg at 17 0843    pantoprazole (PROTONIX) tablet 40 mg  40 mg Oral ACB Zen Knox MD   40 mg at 17 0704    sodium chloride (NS) flush 5-10 mL  5-10 mL IntraVENous PRN Zen Knox MD        WARFARIN INFORMATION NOTE (COUMADIN)   Other Q24H Zen Knox MD           Past Medical History:   Diagnosis Date    A-fib [facility])        History reviewed. No pertinent surgical history. No Known Allergies    Patient Active Problem List   Diagnosis Code    Aphasia R47.01    TIA (transient ischemic attack) G45.9    Essential hypertension I10    Chronic atrial fibrillation (HCC) I48.2    Acquired hypothyroidism E03.9         Review of Systems:   As above otherwise 11 point review of systems negative including;   Constitutional no fever or chills  Skin denies rash or itching  HENT  Denies tinnitus, hearing lose  Eyes denies diplopia vision lose  Respiratory denies shortness of breath  Cardiovascular denies chest pain, dyspnea on exertion  Gastrointestinal denies nausea, vomiting, diarrhea, constipation  Genitourinary denies incontinence  Musculoskeletal denies joint pain or swelling  Endocrine denies weight change  Hematology denies easy bruising or bleeding   Neurological as above in HPI      PHYSICAL EXAMINATION:      VITAL SIGNS:    Visit Vitals    BP (!) 133/93 (BP 1 Location: Left arm, BP Patient Position: At rest)    Pulse 99    Temp 97.5 °F (36.4 °C)    Resp 20    Ht 5' 10\" (1.778 m)    Wt 91.1 kg (200 lb 13.4 oz)    SpO2 96%    BMI 28.82 kg/m2       GENERAL: The patient is well developed, well nourished, and in no apparent distress. EXTREMITIES: No clubbing, cyanosis, or edema is identified. Pulses 2+ and symmetrical.  Muscle tone is normal.  HEAD:   Ear, nose, and throat appear to be without trauma. The patient is normocephalic. NEUROLOGIC EXAMINATION    MENTAL STATUS: The patient is awake, alert, and oriented x 4. Fund of knowledge is adequate. Speech is fluent and memory appears to be intact, both long and short term. CRANIAL NERVES: II  Visual fields are full to confrontation. Funduscopic examination reveals flat disks bilaterally. Pupils are both 3 mm and briskly reactive to light and accommodation. III, IV, VI  Extraocular movements are intact and there is no nystagmus. V  Facial sensation is intact to pinprick and light touch. VII  Face is symmetrical.   VIII - Hearing is present. IX, X, [address] rises symmetrically. Gag is present. Tongue is in the midline. XI - Shoulder shrugging and head turning intact  MOTOR:  The patient is 5/5 in all four limbs without any drift. Fine finger movements are symmetrical.  Isolated motor group testing reveals no focal abnormalities. Tone is normal.  Sensory examination is intact to pinprick, light touch and position sense testing. Reflexes are 2+ and symmetrical. Plantars are down going. Cerebellar examination reveals no gross ataxia or dysmetria. Gait is normal and the patient can tandem walk without any difficulty. Final result (Exam End: 5/14/2017  2:05 PM) Open    Study Result   CT head without IV contrast        All CT scans at this facility are performed using dose optimization technique as  appropriate to a performed exam, to include automated exposure control,  adjustment of the mA and/or kV according to patient size (including appropriate  matching for site specific examination) or use of iterative reconstruction  technique.     INDICATION: Stroke alert. CODE S. Onset of confusion. Unable to remember family  names.     Cortical sulci and ventricles are within normal limits for age. No midline shift  or extra-axial collection. No intracranial hemorrhage, mass lesions or cortical  infarct involving a major vessel. Visualized paranasal sinuses and mastoid air  cells are clear. Lucency in the occipital calvarium with thickening of the inner  and outer table cortex noted.     IMPRESSION  IMPRESSION: No acute intracranial abnormalities by CT. No hemorrhage. Nonspecific lucency in the occipital calvarium. Differential diagnosis would  include metastatic disease and multiple myeloma.  Clinical correlation?     Code S report directly discussed with Dr. Jovanny Le at 1410 hrs               I have reviewed the above imagines myself. CBC:   Lab Results   Component Value Date/Time    WBC 6.0 05/15/2017 12:09 AM    RBC 4.49 05/15/2017 12:09 AM    HGB 14.8 05/15/2017 12:09 AM    HCT 40.8 05/15/2017 12:09 AM    PLATELET 028 00/42/6861 12:09 AM     BMP:   Lab Results   Component Value Date/Time    Glucose 97 05/15/2017 12:09 AM    Sodium 140 05/15/2017 12:09 AM    Potassium 4.0 05/15/2017 12:09 AM    Chloride 105 05/15/2017 12:09 AM    CO2 25 05/15/2017 12:09 AM    BUN 16 05/15/2017 12:09 AM    Creatinine 0.93 05/15/2017 12:09 AM    Calcium 9.8 05/15/2017 12:09 AM     CMP:   Lab Results   Component Value Date/Time    Glucose 97 05/15/2017 12:09 AM    Sodium 140 05/15/2017 12:09 AM    Potassium 4.0 05/15/2017 12:09 AM    Chloride 105 05/15/2017 12:09 AM    CO2 25 05/15/2017 12:09 AM    BUN 16 05/15/2017 12:09 AM    Creatinine 0.93 05/15/2017 12:09 AM    Calcium 9.8 05/15/2017 12:09 AM    Anion gap 10 05/15/2017 12:09 AM    BUN/Creatinine ratio 17 05/15/2017 12:09 AM     Coagulation:   Lab Results   Component Value Date/Time    Prothrombin time 20.3 05/16/2017 03:55 AM    INR 1.8 05/16/2017 03:55 AM    aPTT 42.6 05/14/2017 01:50 PM     Cardiac markers:   Lab Results   Component Value Date/Time     05/15/2017 12:09 AM    CK-MB Index 1.5 05/15/2017 12:09 AM          Impression: TIA in this man who has risk factors including atrial fibrillaltion. apparently he wasn't tolerant of the Ativan they gave him last night. Plan: If the MRI scanning is normal he may leave. Continue Coumadin for stroke risk control.

## 2017-05-16 NOTE — ROUTINE PROCESS
Bedside and Verbal shift change report given to Deneen Sanabria RN (oncoming nurse) by Richard Dumont RN (offgoing nurse). Report given with SBAR, Kardex, Intake/Output, MAR, Accordion and Recent Results. Stroke Education provided to patient and the following topics were discussed    1. Patients personal risk factors for stroke are hypertension and atrial fibrillation    2. Warning signs of Stroke:        * Sudden numbness or weakness of the face, arm or leg, especially on one side of          The body            * Sudden confusion, trouble speaking or understanding        * Sudden trouble seeing in one or both eyes        * Sudden trouble walking, dizziness, loss of balance or coordination        * Sudden severe headache with no known cause      3. Importance of activation Emergency Medical Services ( 9-1-1 ) immediately if experience any warning signs of stroke. 4. Be sure and schedule a follow-up appointment with your primary care doctor or any specialists as instructed. 5. You must take medicine every day to treat your risk factors for stroke. Be sure to take your medicines exactly as your doctor tells you: no more, no less. Know what your medicines are for , what they do. Anti-thrombotics /anticoagulants can help prevent strokes. You are taking the following medicine(s)  LOPRESSOR, PRAVACHOL      6. Smoking and second-hand smoke greatly increase your risk of stroke, cardiovascular disease and death. Smoking history never    7. Information provided was [facility] Stroke Education Binder    8. Documentation of teaching completed in Patient Education Activity and on Care Plan with teaching response noted?   yes

## 2017-05-17 VITALS
RESPIRATION RATE: 18 BRPM | DIASTOLIC BLOOD PRESSURE: 70 MMHG | SYSTOLIC BLOOD PRESSURE: 113 MMHG | BODY MASS INDEX: 28.91 KG/M2 | HEART RATE: 51 BPM | HEIGHT: 70 IN | OXYGEN SATURATION: 97 % | TEMPERATURE: 97.1 F | WEIGHT: 201.94 LBS

## 2017-05-17 LAB
ANION GAP BLD CALC-SCNC: 10 MMOL/L (ref 3–18)
BASOPHILS # BLD AUTO: 0.1 K/UL (ref 0–0.1)
BASOPHILS # BLD: 1 % (ref 0–2)
BUN SERPL-MCNC: 17 MG/DL (ref 7–18)
BUN/CREAT SERPL: 20 (ref 12–20)
CALCIUM SERPL-MCNC: 9.5 MG/DL (ref 8.5–10.1)
CHLORIDE SERPL-SCNC: 106 MMOL/L (ref 100–108)
CK MB CFR SERPL CALC: 2.4 % (ref 0–4)
CK MB SERPL-MCNC: 1.8 NG/ML (ref 5–25)
CK SERPL-CCNC: 74 U/L (ref 39–308)
CO2 SERPL-SCNC: 25 MMOL/L (ref 21–32)
CREAT SERPL-MCNC: 0.86 MG/DL (ref 0.6–1.3)
DIFFERENTIAL METHOD BLD: ABNORMAL
EOSINOPHIL # BLD: 0.2 K/UL (ref 0–0.4)
EOSINOPHIL NFR BLD: 4 % (ref 0–5)
ERYTHROCYTE [DISTWIDTH] IN BLOOD BY AUTOMATED COUNT: 13.5 % (ref 11.6–14.5)
GLUCOSE BLD STRIP.AUTO-MCNC: 134 MG/DL (ref 70–110)
GLUCOSE SERPL-MCNC: 98 MG/DL (ref 74–99)
HCT VFR BLD AUTO: 42.5 % (ref 36–48)
HGB BLD-MCNC: 15.1 G/DL (ref 13–16)
INR PPP: 2.1 (ref 0.8–1.2)
LYMPHOCYTES # BLD AUTO: 30 % (ref 21–52)
LYMPHOCYTES # BLD: 1.6 K/UL (ref 0.9–3.6)
MCH RBC QN AUTO: 33 PG (ref 24–34)
MCHC RBC AUTO-ENTMCNC: 35.5 G/DL (ref 31–37)
MCV RBC AUTO: 92.8 FL (ref 74–97)
MONOCYTES # BLD: 0.4 K/UL (ref 0.05–1.2)
MONOCYTES NFR BLD AUTO: 8 % (ref 3–10)
NEUTS SEG # BLD: 3.1 K/UL (ref 1.8–8)
NEUTS SEG NFR BLD AUTO: 57 % (ref 40–73)
PLATELET # BLD AUTO: 112 K/UL (ref 135–420)
PMV BLD AUTO: 11 FL (ref 9.2–11.8)
POTASSIUM SERPL-SCNC: 3.5 MMOL/L (ref 3.5–5.5)
PROTHROMBIN TIME: 22.6 SEC (ref 11.5–15.2)
RBC # BLD AUTO: 4.58 M/UL (ref 4.7–5.5)
SODIUM SERPL-SCNC: 141 MMOL/L (ref 136–145)
TROPONIN I SERPL-MCNC: <0.02 NG/ML (ref 0–0.04)
WBC # BLD AUTO: 5.4 K/UL (ref 4.6–13.2)

## 2017-05-17 PROCEDURE — A9585 GADOBUTROL INJECTION: HCPCS | Performed by: INTERNAL MEDICINE

## 2017-05-17 PROCEDURE — 85610 PROTHROMBIN TIME: CPT | Performed by: INTERNAL MEDICINE

## 2017-05-17 PROCEDURE — 74011250637 HC RX REV CODE- 250/637: Performed by: INTERNAL MEDICINE

## 2017-05-17 PROCEDURE — 82962 GLUCOSE BLOOD TEST: CPT

## 2017-05-17 PROCEDURE — 85025 COMPLETE CBC W/AUTO DIFF WBC: CPT | Performed by: INTERNAL MEDICINE

## 2017-05-17 PROCEDURE — 74011250636 HC RX REV CODE- 250/636: Performed by: INTERNAL MEDICINE

## 2017-05-17 PROCEDURE — 99218 HC RM OBSERVATION: CPT

## 2017-05-17 PROCEDURE — 74011250636 HC RX REV CODE- 250/636: Performed by: HOSPITALIST

## 2017-05-17 PROCEDURE — 80048 BASIC METABOLIC PNL TOTAL CA: CPT | Performed by: INTERNAL MEDICINE

## 2017-05-17 PROCEDURE — 36415 COLL VENOUS BLD VENIPUNCTURE: CPT | Performed by: INTERNAL MEDICINE

## 2017-05-17 PROCEDURE — 96372 THER/PROPH/DIAG INJ SC/IM: CPT

## 2017-05-17 PROCEDURE — 82550 ASSAY OF CK (CPK): CPT | Performed by: HOSPITALIST

## 2017-05-17 RX ORDER — WARFARIN 6 MG/1
6 TABLET ORAL DAILY
Qty: 15 TAB | Refills: 0 | Status: SHIPPED | OUTPATIENT
Start: 2017-05-17

## 2017-05-17 RX ORDER — METOPROLOL TARTRATE 25 MG/1
12.5 TABLET, FILM COATED ORAL EVERY 12 HOURS
Qty: 60 TAB | Refills: 0 | Status: SHIPPED | OUTPATIENT
Start: 2017-05-17

## 2017-05-17 RX ORDER — ATORVASTATIN CALCIUM 80 MG/1
80 TABLET, FILM COATED ORAL
Qty: 30 TAB | Refills: 0 | Status: SHIPPED | OUTPATIENT
Start: 2017-05-17

## 2017-05-17 RX ADMIN — METOPROLOL TARTRATE 12.5 MG: 25 TABLET ORAL at 09:30

## 2017-05-17 RX ADMIN — LEVOTHYROXINE SODIUM 25 MCG: 25 TABLET ORAL at 07:18

## 2017-05-17 RX ADMIN — TAMSULOSIN HYDROCHLORIDE 0.4 MG: 0.4 CAPSULE ORAL at 09:29

## 2017-05-17 RX ADMIN — RAMIPRIL 10 MG: 5 CAPSULE ORAL at 09:29

## 2017-05-17 RX ADMIN — GADOBUTROL 10 ML: 604.72 INJECTION INTRAVENOUS at 00:36

## 2017-05-17 RX ADMIN — PANTOPRAZOLE SODIUM 40 MG: 40 TABLET, DELAYED RELEASE ORAL at 07:18

## 2017-05-17 RX ADMIN — ENOXAPARIN SODIUM 40 MG: 40 INJECTION SUBCUTANEOUS at 09:34

## 2017-05-17 NOTE — PROGRESS NOTES
Spoke with pt regarding discharge today. Pt reports having a ride home with family and declines [place] at this time. No discharge needs identified at this time.

## 2017-05-17 NOTE — ROUTINE PROCESS
Bedside and Verbal shift change report given to Ivan Viera RN (oncoming nurse) by Sonya Archer (offgoing nurse). Report included the following information SBAR, Kardex and Recent Results.

## 2017-05-17 NOTE — PROGRESS NOTES
[facility] - Progress Note  Admit Date: 5/14/2017    Assessment:     Hospital Problems  Date Reviewed: 5/14/2017          Codes Class Noted POA    Aphasia ICD-10-CM: R47.01  ICD-9-CM: 784.3  5/14/2017 Unknown        TIA (transient ischemic attack) ICD-10-CM: G45.9  ICD-9-CM: 435.9  5/14/2017 Unknown        Essential hypertension ICD-10-CM: I10  ICD-9-CM: 401.9  5/14/2017 Unknown        Chronic atrial fibrillation ([facility] [address] ICD-10-CM: I48.2  ICD-9-CM: 427.31  5/14/2017 Unknown        Acquired hypothyroidism ICD-10-CM: E03.9  ICD-9-CM: 244.9  5/14/2017 Unknown              -Admitted for TIA symptoms. MRI pending.   -Persistent  atrial fibrillation, on BB and chronic coumadin. INR sub-therapeutic currently, but historically his INR has been fairly consistent followed by [facility]. S/p unsuccessful afib ablation 6/2016. EF wnl on echo today. -Tachy/ronald syndrome ? HR fluctuating from 115-40's on tele. 2.5 second pauses intermittently. Asymptomatic. This was after receiving metoprolol XL 50 mg this am.  -Hx of bradycardia  -S/p afib ablation June 2016  -HTN  -HLD  -Hypothyroidism, on synthroid, TSH normal this admission. Plan:     Rates currently stable with still with some episodes of tachy/ronald overnight, will continue low dose lopressor at this time. But will likely need PPM in near future as discussed. Anticoagulated with warfarin with INR 2.1 this AM.  Awaiting MRI results. Subjective:     No new complaints.      Objective:      Patient Vitals for the past 8 hrs:   Temp Pulse Resp BP SpO2   05/17/17 0800 97.9 °F (36.6 °C) 100 18 133/80 97 %   05/17/17 0400 97.4 °F (36.3 °C) 66 18 119/70 98 %         Patient Vitals for the past 96 hrs:   Weight   05/17/17 0521 91.6 kg (201 lb 15.1 oz)   05/16/17 0641 91.1 kg (200 lb 13.4 oz)   05/14/17 1915 91.8 kg (202 lb 4.8 oz)   05/14/17 1403 90.7 kg (200 lb)                    Intake/Output Summary (Last 24 hours) at 05/17/17 1104  Last data filed at 05/17/17 0422   Gross per 24 hour   Intake              240 ml   Output                0 ml   Net              240 ml       Physical Exam:  General:  alert, cooperative, no distress, appears stated age  Neck:  no JVD  Lungs:  clear to auscultation bilaterally  Heart:  regular rate and rhythm  Abdomen:  abdomen is soft without significant tenderness, masses, organomegaly or guarding  Extremities:  extremities normal, atraumatic, no cyanosis or edema    Data Review:     Labs: Results:       Chemistry Recent Labs      05/17/17   0327  05/15/17   0009  05/14/17   1350   GLU  98  97  122*   NA  141  140  136   K  3.5  4.0  4.2   CL  106  105  102   CO2  25  25  28   BUN  17  16  18   CREA  0.86  0.93  1.05   CA  9.5  9.8  9.5   AGAP  10  10  6   BUCR  20  17  17      CBC w/Diff Recent Labs      05/17/17   0327  05/15/17   0009  05/14/17   1350   WBC  5.4  6.0  6.0   RBC  4.58*  4.49*  4.71   HGB  15.1  14.8  15.6   HCT  42.5  40.8  43.1   PLT  112*  124*  117*   GRANS  57  59  57   LYMPH  30  30  31   EOS  4  3  4      Cardiac Enzymes Lab Results   Component Value Date/Time    CPK 82 05/16/2017 06:00 PM    CKMB 2.1 05/16/2017 06:00 PM    CKND1 2.6 05/16/2017 06:00 PM    TROIQ <0.02 05/16/2017 06:00 PM      Coagulation Recent Labs      05/17/17   0327  05/16/17   0355   05/14/17   1350   PTP  22.6*  20.3*   < >  20.4*   INR  2.1*  1.8*   < >  1.8*   APTT   --    --    --   42.6*    < > = values in this interval not displayed. Lipid Panel Lab Results   Component Value Date/Time    Cholesterol, total 206 05/15/2017 12:09 AM    HDL Cholesterol 33 05/15/2017 12:09 AM    LDL, calculated  05/15/2017 12:09 AM     LDL AND VLDL CHOLESTEROL NOT CALCULATED WHEN TRIGLYCERIDES >400 MG/DL OR HDL CHOLESTEROL <20 MG/DL    VLDL, calculated  05/15/2017 12:09 AM     Calculation not valid with this patient's other Lipid values.     Triglyceride 433 05/15/2017 12:09 AM    CHOL/HDL Ratio 6.2 05/15/2017 12:09 AM      BNP No results found for: BNP, BNPP, XBNPT   Liver Enzymes No results for input(s): TP, ALB, TBIL, AP, SGOT, GPT in the last 72 hours.     No lab exists for component: DBIL   Digoxin    Thyroid Studies Lab Results   Component Value Date/Time    TSH 3.38 05/16/2017 06:00 PM          Signed By: ARELY Gallagher     May 17, 2017

## 2017-05-17 NOTE — DISCHARGE INSTRUCTIONS
Discharge Instructions    Patient: Luis Orozco MRN: [id number]  CSN: [id number]    YOB: 1940  Age: 77 y.o. Sex: male    DOA: 2017 LOS:  LOS: 0 days   Discharge Date:      DIET:  Cardiac Diet    ACTIVITY: Activity as tolerated    ADDITIONAL INFORMATION: If you experience any of the following symptoms but not limited to Fever, chills, nausea, vomiting, diarrhea, change in mentation, falling, bleeding, shortness of breath, chest pain, please call your primary care physician or return to the emergency room if you cannot get hold of your doctor:     FOLLOW UP CARE:  Dr. Jo Dye MD in 7-10 days. Please call and set up an appointment. Dr. Td Regalado, cardio in 2 week  Dr. Rhonda Stoddard, neuro in 4 week      Esperanza Stroud MD  2017 2:25 PM    [employer] Activation    Thank you for requesting access to [address] Please follow the instructions below to securely access and download your online medical record. [employer] allows you to send messages to your doctor, view your test results, renew your prescriptions, schedule appointments, and more. How Do I Sign Up? 1. In your internet browser, go to [email or web address]  2. Click on the First Time User? Click Here link in the Sign In box. You will be redirect to the New Member Sign Up page. 3. Enter your [employer] Access Code exactly as it appears below. You will not need to use this code after youve completed the sign-up process. If you do not sign up before the expiration date, you must request a new code. [employer] Access Code: [email or web address]  Expires: 2017 12:45 PM (This is the date your [employer] access code will )    4. Enter the last four digits of your Social Security Number (xxxx) and Date of Birth (mm/dd/yyyy) as indicated and click Submit. You will be taken to the next sign-up page. 5. Create a [employer] ID.  This will be your [employer] login ID and cannot be changed, so think of one that is secure and easy to remember. 6. Create a [employer] password. You can change your password at any time. 7. Enter your Password Reset Question and Answer. This can be used at a later time if you forget your password. 8. Enter your e-mail address. You will receive e-mail notification when new information is available in [address] 9. Click Sign Up. You can now view and download portions of your medical record. 10. Click the Download Summary menu link to download a portable copy of your medical information. Additional Information    If you have questions, please visit the Frequently Asked Questions section of the [employer] website at [email or web address] Remember, [employer] is NOT to be used for urgent needs. For medical emergencies, dial 911. Patient armband removed and shredded    DISCHARGE SUMMARY from Nurse    The following personal items are in your possession at time of discharge:    Dental Appliances: None  Visual Aid: Glasses, With patient     Home Medications: None  Jewelry: With patient  Clothing: Shorts, Shirt, Footwear, Undergarments  Other Valuables: Cell Phone             PATIENT INSTRUCTIONS:    What to do at Home:  Recommended activity: Activity as tolerated,     If you experience any of the following symptoms chest pain, shortness of breath, fever, chills, elevated temperature greater than 100.9 F, please follow up with nearest Emergency room. *  Please give a list of your current medications to your Primary Care Provider. *  Please update this list whenever your medications are discontinued, doses are      changed, or new medications (including over-the-counter products) are added. *  Please carry medication information at all times in case of emergency situations.           These are general instructions for a healthy lifestyle:    No smoking/ No tobacco products/ Avoid exposure to second hand smoke    Surgeon General's Warning:  Quitting smoking now greatly reduces serious risk to your health. Obesity, smoking, and sedentary lifestyle greatly increases your risk for illness    A healthy diet, regular physical exercise & weight monitoring are important for maintaining a healthy lifestyle    You may be retaining fluid if you have a history of heart failure or if you experience any of the following symptoms:  Weight gain of 3 pounds or more overnight or 5 pounds in a week, increased swelling in our hands or feet or shortness of breath while lying flat in bed. Please call your doctor as soon as you notice any of these symptoms; do not wait until your next office visit. Recognize signs and symptoms of STROKE:    F-face looks uneven    A-arms unable to move or move unevenly    S-speech slurred or non-existent    T-time-call 911 as soon as signs and symptoms begin-DO NOT go       Back to bed or wait to see if you get better-TIME IS BRAIN. Warning Signs of HEART ATTACK     Call 911 if you have these symptoms:   Chest discomfort. Most heart attacks involve discomfort in the center of the chest that lasts more than a few minutes, or that goes away and comes back. It can feel like uncomfortable pressure, squeezing, fullness, or pain.  Discomfort in other areas of the upper body. Symptoms can include pain or discomfort in one or both arms, the back, neck, jaw, or stomach.  Shortness of breath with or without chest discomfort.  Other signs may include breaking out in a cold sweat, nausea, or lightheadedness. Don't wait more than five minutes to call 911 - MINUTES MATTER! Fast action can save your life. Calling 911 is almost always the fastest way to get lifesaving treatment. Emergency Medical Services staff can begin treatment when they arrive -- up to an hour sooner than if someone gets to the hospital by car. The discharge information has been reviewed with the patient. The patient verbalized understanding.     Discharge medications reviewed with the patient and appropriate educational materials and side effects teaching were provided.

## 2017-05-17 NOTE — DISCHARGE SUMMARY
[facility] [address]  [address] [place] SUMMARY    Name:  Arnel Rodriguez  MR#:  [id number]  :  1940  Account #:  [de-identified]  Date of Adm:  2017  Date of Discharge:  2017      PRIMARY CARE PHYSICIAN: Brian Hernandez MD    DISPOSITION: Discharged to home. DISCHARGE CONDITION: Stable. DISCHARGE DIAGNOSES  1. Transient ischemic attack, cerebrovascular accident ruled out with  negative MRI. 2. Chronic atrial fibrillation with variable rate. 3. Hypertension. 4. Dyslipidemia. 5. Hypothyroidism. 6. 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 DISCHARGE MEDICATIONS  1. Metoprolol 12.5 mg b.i.d.  2. Lipitor 80 mg at bedtime. 3. Coumadin 6 mg daily, dose needs to be adjusted to keep INR  between 2.0 to 3.0.  4. Synthroid 25 mcg daily. 5. Col-Benemid 1 tablet daily. 6. Ramipril 10 mg daily. 7. Flomax 0.4 mg daily. 8. Medication discontinued in the hospital: Amlodipine has been  discontinued. 9. Metoprolol dose has been decreased. [address] [facility].  1. Consultation with Dr. Goldie Diallo, Neurology. 2. Consultation with Dr. Isabella Seo, Cardiology. [address]  1. The patient had an MRI brain which showed no acute intracranial  abnormality. Brain appears normal for age. 2. MRA head and neck are negative too. 3. The patient also had an echocardiogram which was within normal  limits. HOSPITAL COURSE: A 52-year-old  male presents to the  emergency room with an episode of temporary speech disturbance  and confusion which improved and back to baseline within a few  minutes. The patient had a CT head which was negative. The patient  had a CODE S in the ED and was admitted to the hospital. MRA was  ordered and Neurology was consulted. The patient has known history  of atrial fibrillation and his INR was slightly subtherapeutic at 1.8 on  admission. INR was monitored in the hospital. It remained anywhere  from 1.8 to 2.1.  The patient did not have any further symptoms in the  hospital. Initially, he could not do the MRI because of movement, but  on a second attempt, he was able to do the MRI. MRI came back  negative for any stroke. The patient in the hospital was noted to have  bradycardia with heart rate dropping up to 36 and he had a pause up  to 2.1 seconds, for which his beta blockers and amlodipine were  stopped and Cardiology was consulted. Cardiology saw the patient and  thought that the patient most likely had tachy-ronald syndrome. Recommended to start low-dose of beta blocker as his heart rate  started going up to 120s. Cardiology recommended if the patient's  heart rate remained stable, he could be discharged. The patient was  given low-dose of beta blocker today. His heart rate has been in the  low 50's. couple of times it dropped to the high 30s, but did not  have any significant drops and currently, I am waiting for Cardiology to  follow up and further recommendations. If the patient has been  cleared by Cardiology, the patient will be discharged home today. I have discussed with Neurology, who reviewed the MRA and does not  think the patient had a stroke, so if Neurology clears the patient too,  the patient will be discharged home today. I had a long discussion with the patient and also with his son at the  bedside. I discussed that since his INR is fluctuating, would be risk of  thromboembolic event, I recommended the patient would be beneficial  to use Xarelto or Eliquis, as there is no need for levels to be  maintained or monitored. I explained to the patient the risks and  benefits. He completely understood, but he wants to continue with  Coumadin. Since the INR is on the lower side, I have increased his  Coumadin dose and the patient also states he will follow up with  Cardiology and he will discuss further about new anticoagulations and  we will change accordingly.     The patient alert, awake, oriented x3, along with his son at the  bedside. I discussed with them about the discharge plan and followup  appointments. Both of them completely understood and agreed with  the plan. I answered all their questions and concerns at the bedside  appropriately. Discharge instructions, followup appointments and physical exam,  please refer to electronic medical records.         MD Maty Farias / Deisy Olmos  D:  05/17/2017   14:37  T:  05/17/2017   18:17  Job #:  [id number]

## 2017-05-17 NOTE — PROGRESS NOTES
Discharge instructions given verbally and written to pt and pt's son all questions answered IV, tele and armband discontinued awaiting transport to transport to [facility]

## 2017-05-17 NOTE — PROGRESS NOTES
Re:  Kalyani Novoa,Follow up visit     5/17/2017 2:31 PM    SSN: [id number]    Subjective:   Agata Cole is seen in the chair with a visitor.       Medications:    Current Facility-Administered Medications   Medication Dose Route Frequency Provider Last Rate Last Dose    enoxaparin (LOVENOX) injection 40 mg  40 mg SubCUTAneous Q24H Eino Shone, MD   40 mg at 05/17/17 0934    metoprolol tartrate (LOPRESSOR) tablet 12.5 mg  12.5 mg Oral Q12H Corey Lawton MD   12.5 mg at 05/17/17 0930    atorvastatin (LIPITOR) tablet 80 mg  80 mg Oral QHS Kateryna Hwang MD   80 mg at 05/16/17 2245    sodium chloride (NS) flush 5-10 mL  5-10 mL IntraVENous PRN Felicia Kiser MD        ramipril (ALTACE) capsule 10 mg  10 mg Oral DAILY Felicia Kiser MD   10 mg at 05/17/17 0929    levothyroxine (SYNTHROID) tablet 25 mcg  25 mcg Oral ACB Felicia Kiser MD   25 mcg at 05/17/17 0718    tamsulosin (FLOMAX) capsule 0.4 mg  0.4 mg Oral DAILY Felicia Kiser MD   0.4 mg at 05/17/17 0929    pantoprazole (PROTONIX) tablet 40 mg  40 mg Oral ACB Felicia Kiser MD   40 mg at 05/17/17 5766    sodium chloride (NS) flush 5-10 mL  5-10 mL IntraVENous PRN MD Sofi Hassan WARFARIN INFORMATION NOTE (COUMADIN)   Other Q24H Felicia Kiser MD           Vital signs:    Visit Vitals    /70 (BP 1 Location: Left arm, BP Patient Position: Sitting)    Pulse (!) 51    Temp 97.1 °F (36.2 °C)    Resp 18    Ht 5' 10\" (1.778 m)    Wt 91.6 kg (201 lb 15.1 oz)    SpO2 97%    BMI 28.98 kg/m2       Review of Systems:   As above otherwise 11 point review of systems negative including;   Constitutional no fever or chills  Skin denies rash or itching  HEENT  Denies tinnitus, hearing lose  Eyes denies diplopia vision lose  Respiratory denies sortness of breath  Cardiovascular denies chest pain, dyspnea on exertion  Gastrointestinal denies nausea, vomiting, diarrhea, constipation  Genitourinary denies incontinence  Musculoskeletal denies joint pain or swelling  Endocrine denies weight change  Hematology denies easy bruising or bleeding   Neurological as above in HPI      Patient Active Problem List   Diagnosis Code    Aphasia R47.01    TIA (transient ischemic attack) G45.9    Essential hypertension I10    Chronic atrial fibrillation (HCC) I48.2    Acquired hypothyroidism E03.9         Objective: The patient is awake, alert, and oriented x 4. Fund of knowledge is adequate. Speech is fluent and memory is intact. Cranial Nerves: II  Visual fields are full to confrontation. III, IV, VI  Extraocular movements are intact. There is no nystagmus. V  Facial sensation is intact to pinprick. VII  Face is symmetrical.  VIII - Hearing is present. IX, X, XII  Palate is symmetrical.   XI - Shoulder shrugging and head turning intact  Motor: The patient moves all four limbs fairly well and symmetrically. Tone is normal. Reflexes are 2+ and symmetrical. Plantars are down going. Gait is normal.    Final result (Exam End: 5/17/2017 12:44 AM) Open    Study Result   MR Brain With And Without Contrast     CPT CODE: 38622     HISTORY: TIA. Episode of abnormal mentation, could not remember names of  familiar people.     COMPARISON: CT head 5/14/2017     TECHNIQUE: Brain scanned with axial and sagittal T1W scans, axial T2W scans,  axial FLAIR, axial diffusion weighted images, GRE, and post gadolinium axial and  coronal T1W scans.     CONTRAST: 10 mL Gadavist IV.     FINDINGS:      Negative diffusion sequence. No acute infarct. Grossly patent flow in the major  vessels. No focal abnormal extra-axial fluid collections over the cerebral  convexities. Some mild widening of cortical sulci consistent with mild volume  loss. No focal cerebral volume loss. Ventricles are not enlarged. No  hydrocephalus. Cerebral white matter signal appears normal for age. No chronic  cortical infarcts. No chronic lacunar infarcts.  No chronic microhemorrhage on  GRE images. Postcontrast axial and coronal T1 images are mildly degraded by motion and  pulsation artifacts. There is no enhancing brain lesion identified. No masses. Normal marrow signal in the calvarium and upper cervical spine. Small left mastoid effusion.     IMPRESSION  IMPRESSION:     No acute intracranial abnormality. Negative for mass, hemorrhage, or infarct. Brain appears normal for age. Trace amount of left mastoid fluid. Final result (Exam End: 5/16/2017  1:09 AM) Open    Study Result   Brain Artery MRA     CPT CODE: 86207     HISTORY: Stroke symptoms. Aphasia     COMPARISON: None.     TECHNIQUE: Brain arteries scanned with axial 3D TOF source images from near  foramen magnum up to sylvian region, MIP reformatted images generated by  technologist.      FINDINGS:         At the right anterior circulation, ICA is patent to the terminus, no stenosis. GEETA appears normal. Proximal MCA including the M1 and proximal M2 segments  appears normal. The more distal vessels are not well imaged.     At the left anterior circulation, ICA appears normal to the terminus. No  stenosis or aneurysm. GEETA appears normal. Proximal MCA normal. No stenosis.     At the posterior circulation, codominant intradural vertebral arteries. No  stenosis. Basilar appears normal. Posterior cerebral arteries normal.     IMPRESSION  IMPRESSION:     No findings of intracranial stenosis. No major branch occlusion or aneurysm. The distal MCA and PCA branches are not well imaged, but no proximal stenosis.        Final result (Exam End: 5/17/2017 12:43 AM) Open    Study Result   MRA NECK WITH AND WITHOUT CONTRAST     HISTORY: Episode of aphasia, resolved.     COMPARISON: None.     TECHNIQUE: Noncontrast scanning of the neck arteries focused at the carotid  artery bifurcations was accomplished with 2D TOF technique. Pre-and post  gadolinium coronal 3-D MRA images of the neck.  Images were post processed with  MIP algorithm.     CONTRAST: 10 mL Gadavist IV     FINDINGS:      Antegrade flow seen within the bilateral cervical carotid and vertebral  arteries.     Aortic Arch: Typical arch anatomy. Patent innominate and bilateral subclavian  arteries.     Right Carotid: Right CCA is normal. ICA origin is normal. No stenosis. 0%  NASCET narrowing .     Right Vertebral: No stenosis at the origin. Patent to the skull base.      Left Carotid: Cannot evaluate the origin from the aortic arch. Proximal vessel  is patent, but complete loss of signal at the thoracic inlet, appears to be due  to overlapping artifacts. Above the signal loss the CCA is widely patent to the  bifurcation. Some mild atheromatous plaque at the carotid bifurcation along the  posterior ICA wall. The minimum lumen is 2.8 mm. Distal vessel 3.6 mm. 25%  NASCET narrowing.     Left Vertebral: Some loss of signal in the region of the origin from the left  subclavian, patent but cannot assess for stenosis. Beyond the proximal segment  the vertebral artery is patent to the skull base, no stenosis.     IMPRESSION  IMPRESSION:     1. Approximately 25% NASCET left ICA origin narrowing. 2. No right ICA or right vertebral stenosis. 3. Loss of signal in the upper chest appears to be related to respiratory and  cardiac motion artifacts. Cannot evaluate the proximal left CCA, but remainder  of the CCA is normal. Similar limited evaluation of the LVA origin, otherwise  normal.         I have reviewed the above imagines myself.     CBC:   Lab Results   Component Value Date/Time    WBC 5.4 05/17/2017 03:27 AM    RBC 4.58 05/17/2017 03:27 AM    HGB 15.1 05/17/2017 03:27 AM    HCT 42.5 05/17/2017 03:27 AM    PLATELET 750 73/47/5416 03:27 AM     BMP:   Lab Results   Component Value Date/Time    Glucose 98 05/17/2017 03:27 AM    Sodium 141 05/17/2017 03:27 AM    Potassium 3.5 05/17/2017 03:27 AM    Chloride 106 05/17/2017 03:27 AM    CO2 25 05/17/2017 03:27 AM    BUN 17 05/17/2017 03:27 AM    Creatinine 0.86 05/17/2017 03:27 AM    Calcium 9.5 05/17/2017 03:27 AM     CMP:   Lab Results   Component Value Date/Time    Glucose 98 05/17/2017 03:27 AM    Sodium 141 05/17/2017 03:27 AM    Potassium 3.5 05/17/2017 03:27 AM    Chloride 106 05/17/2017 03:27 AM    CO2 25 05/17/2017 03:27 AM    BUN 17 05/17/2017 03:27 AM    Creatinine 0.86 05/17/2017 03:27 AM    Calcium 9.5 05/17/2017 03:27 AM    Anion gap 10 05/17/2017 03:27 AM    BUN/Creatinine ratio 20 05/17/2017 03:27 AM     Coagulation:   Lab Results   Component Value Date/Time    Prothrombin time 22.6 05/17/2017 03:27 AM    INR 2.1 05/17/2017 03:27 AM    aPTT 42.6 05/14/2017 01:50 PM     Cardiac markers:   Lab Results   Component Value Date/Time    CK 74 05/17/2017 10:14 AM    CK-MB Index 2.4 05/17/2017 10:14 AM       Assessment:  TIA while on coumadin, slightly subtherapeutic who has risk factors including atrial fibrillation. The TIA was likely due to the slightly low INR    Plan:  OK to DC from my standpoint. Should keep INR >2.0. Sincerely,        Robert Lopez.  Kayla Gracia M.D.

## 2017-05-17 NOTE — DISCHARGE SUMMARY
Discharge Summary    Patient: Faustino Mcdaniels MRN: [id number]  CSN: [id number]    YOB: 1940  Age: 77 y.o. Sex: male    DOA: 5/14/2017 LOS:  LOS: 0 days   Discharge Date:      Admission Diagnoses: Aphasia  Aphasia    Discharge Diagnoses:  PLEASE SEE DICTATION. Discharge Condition: Stable    PHYSICAL EXAM  Visit Vitals    /70 (BP 1 Location: Left arm, BP Patient Position: Sitting)    Pulse (!) 51    Temp 97.1 °F (36.2 °C)    Resp 18    Ht 5' 10\" (1.778 m)    Wt 91.6 kg (201 lb 15.1 oz)    SpO2 97%    BMI 28.98 kg/m2       General: Alert, cooperative, no acute distress    Lungs:  CTA Bilaterally. No Wheezing/Rhonchi/Rales. Heart:  IRRegular rate and Rhythm. Abdomen: Soft, Non distended, Non tender. + Bowel sounds. Extremities: No edema/ cyanosis/ clubbing  Psych:   Good insight. Not anxious or agitated. Neurologic:  AA oriented X 3. Moves all extremities. Hospital Course: Please see dictation. code # [id number]. Discharge Medications:     Current Discharge Medication List      START taking these medications    Details   atorvastatin (LIPITOR) 80 mg tablet Take 1 Tab by mouth nightly. Qty: 30 Tab, Refills: 0      metoprolol tartrate (LOPRESSOR) 25 mg tablet Take 0.5 Tabs by mouth every twelve (12) hours. Qty: 60 Tab, Refills: 0         CONTINUE these medications which have CHANGED    Details   warfarin (COUMADIN) 6 mg tablet Take 1 Tab by mouth daily. Qty: 15 Tab, Refills: 0         CONTINUE these medications which have NOT CHANGED    Details   probenecid-colchicine (COLBENEMID) 500-0.5 mg per tablet Take 1 Tab by mouth daily. ramipril (ALTACE) 10 mg capsule Take 10 mg by mouth daily. levothyroxine (SYNTHROID) 25 mcg tablet Take 25 mcg by mouth Daily (before breakfast). tamsulosin (FLOMAX) 0.4 mg capsule Take 0.4 mg by mouth daily.          STOP taking these medications       pravastatin (PRAVACHOL) 20 mg tablet Comments:   Reason for Stopping:         amLODIPine (NORVASC) 5 mg tablet Comments:   Reason for Stopping:         metoprolol succinate (TOPROL-XL) 50 mg XL tablet Comments:   Reason for Stopping:             · It is important that you take the medication exactly as they are prescribed. · Keep your medication in the bottles provided by the pharmacist and keep a list of the medication names, dosages, and times to be taken in your wallet. · Do not take other medications without consulting your doctor. DIET:  Cardiac Diet    ACTIVITY: Activity as tolerated    ADDITIONAL INFORMATION: If you experience any of the following symptoms but not limited to Fever, chills, nausea, vomiting, diarrhea, change in mentation, falling, bleeding, shortness of breath, chest pain, please call your primary care physician or return to the emergency room if you cannot get hold of your doctor:     FOLLOW UP CARE:  Dr. Rafael Greenfield MD in 7-10 days. Please call and set up an appointment.   Dr. Rogene Carrel, cardio in 2 week  Dr. Crow Nieves neuro in 4 week    Minutes spent on discharge: 40 minutes spent coordinating this discharge (review instructions/follow-up, prescriptions, preparing report for sign off)    Shay Farrell MD  5/17/2017 2:26 PM

## 2017-05-17 NOTE — ROUTINE PROCESS
Bedside and Verbal shift change report given to [facility] RN (oncoming nurse) by Nicole Cheng RN (offgoing nurse). Report given with SBAR, Kardex, Intake/Output, MAR, Accordion and Recent Results.
